# Patient Record
Sex: MALE | Race: WHITE | NOT HISPANIC OR LATINO | Employment: FULL TIME | URBAN - METROPOLITAN AREA
[De-identification: names, ages, dates, MRNs, and addresses within clinical notes are randomized per-mention and may not be internally consistent; named-entity substitution may affect disease eponyms.]

---

## 2017-01-27 ENCOUNTER — OFFICE VISIT (OUTPATIENT)
Dept: ORTHOPEDIC SURGERY | Facility: CLINIC | Age: 57
End: 2017-01-27

## 2017-01-27 VITALS — HEIGHT: 73 IN | BODY MASS INDEX: 28.49 KG/M2 | WEIGHT: 215 LBS | TEMPERATURE: 98 F

## 2017-01-27 DIAGNOSIS — M17.11 PRIMARY OSTEOARTHRITIS OF RIGHT KNEE: ICD-10-CM

## 2017-01-27 DIAGNOSIS — G89.29 CHRONIC PAIN OF RIGHT KNEE: Primary | ICD-10-CM

## 2017-01-27 DIAGNOSIS — M25.561 CHRONIC PAIN OF RIGHT KNEE: Primary | ICD-10-CM

## 2017-01-27 DIAGNOSIS — M75.101 TEAR OF RIGHT ROTATOR CUFF, UNSPECIFIED TEAR EXTENT: ICD-10-CM

## 2017-01-27 PROCEDURE — 20610 DRAIN/INJ JOINT/BURSA W/O US: CPT | Performed by: ORTHOPAEDIC SURGERY

## 2017-01-27 PROCEDURE — 73560 X-RAY EXAM OF KNEE 1 OR 2: CPT | Performed by: ORTHOPAEDIC SURGERY

## 2017-01-27 PROCEDURE — 73565 X-RAY EXAM OF KNEES: CPT | Performed by: ORTHOPAEDIC SURGERY

## 2017-01-27 RX ORDER — IBUPROFEN 800 MG/1
800 TABLET ORAL EVERY 6 HOURS PRN
COMMUNITY
End: 2018-08-08

## 2017-01-27 RX ADMIN — BUPIVACAINE HYDROCHLORIDE 2 ML: 5 INJECTION, SOLUTION PERINEURAL at 13:15

## 2017-01-27 RX ADMIN — LIDOCAINE HYDROCHLORIDE 2 ML: 10 INJECTION, SOLUTION INFILTRATION; PERINEURAL at 13:15

## 2017-01-27 RX ADMIN — METHYLPREDNISOLONE ACETATE 160 MG: 80 INJECTION, SUSPENSION INTRA-ARTICULAR; INTRALESIONAL; INTRAMUSCULAR; SOFT TISSUE at 13:14

## 2017-01-27 RX ADMIN — METHYLPREDNISOLONE ACETATE 160 MG: 80 INJECTION, SUSPENSION INTRA-ARTICULAR; INTRALESIONAL; INTRAMUSCULAR; SOFT TISSUE at 13:15

## 2017-01-27 RX ADMIN — BUPIVACAINE HYDROCHLORIDE 2 ML: 5 INJECTION, SOLUTION PERINEURAL at 13:14

## 2017-01-27 RX ADMIN — LIDOCAINE HYDROCHLORIDE 2 ML: 10 INJECTION, SOLUTION INFILTRATION; PERINEURAL at 13:14

## 2017-01-27 NOTE — MR AVS SNAPSHOT
Gui Zamora   2017 11:45 AM   Office Visit    Dept Phone:  [phone]   Encounter #:  [id number]    Provider:  Joao Lentz MD   Department:  [facility]                Your Full Care Plan              Your Updated Medication List          This list is accurate as of: 17 12:26 PM.  Always use your most recent med list.                ibuprofen 800 MG tablet   Commonly known as:  ADVIL,MOTRIN               We Performed the Following     XR Knee 1 or 2 View Bilateral     XR Knee 1 or 2 View Right       You Were Diagnosed With        Codes Comments    Chronic pain of right knee    -  Primary ICD-10-CM: M25.561, G89.29  ICD-9-CM: 719.46, 338.29       Instructions     None    Patient Instructions History      Upcoming Appointments     Visit Type Date Time Department    FOLLOW UP 2017 11:45 AM [facility]      nWay Signup     [facility] nWay allows you to send messages to your doctor, view your test results, renew your prescriptions, schedule appointments, and more. To sign up, go to [email or web address] and click on the Sign Up Now link in the New User? box. Enter your nWay Activation Code exactly as it appears below along with the last four digits of your Social Security Number and your Date of Birth () to complete the sign-up process. If you do not sign up before the expiration date, you must request a new code.    nWay Activation Code: [email or web address]  Expires: 2/10/2017 12:26 PM    If you have questions, you can email [email or web address] or call [phone] to talk to our nWay staff. Remember, nWay is NOT to be used for urgent needs. For medical emergencies, dial 911.               Other Info from Your Visit           Allergies     No Known Allergies      Reason for Visit     Right Knee - Follow-up, Pain           Vital Signs     Temperature Height Weight Body Mass  "Index Smoking Status       98 °F (36.7 °C) 73\" (185.4 cm) 215 lb (97.5 kg) 28.37 kg/m2 Never Smoker       Problems and Diagnoses Noted     Chronic pain of right knee    -  Primary      Results     XR Knee 1 or 2 View Bilateral           XR Knee 1 or 2 View Right               "

## 2017-01-29 RX ORDER — BUPIVACAINE HYDROCHLORIDE 5 MG/ML
2 INJECTION, SOLUTION PERINEURAL
Status: COMPLETED | OUTPATIENT
Start: 2017-01-27 | End: 2017-01-27

## 2017-01-29 RX ORDER — METHYLPREDNISOLONE ACETATE 80 MG/ML
160 INJECTION, SUSPENSION INTRA-ARTICULAR; INTRALESIONAL; INTRAMUSCULAR; SOFT TISSUE
Status: COMPLETED | OUTPATIENT
Start: 2017-01-27 | End: 2017-01-27

## 2017-01-29 RX ORDER — LIDOCAINE HYDROCHLORIDE 10 MG/ML
2 INJECTION, SOLUTION INFILTRATION; PERINEURAL
Status: COMPLETED | OUTPATIENT
Start: 2017-01-27 | End: 2017-01-27

## 2017-12-22 ENCOUNTER — OFFICE VISIT (OUTPATIENT)
Dept: ORTHOPEDIC SURGERY | Facility: CLINIC | Age: 57
End: 2017-12-22

## 2017-12-22 VITALS — TEMPERATURE: 98 F | HEIGHT: 73 IN | WEIGHT: 225 LBS | BODY MASS INDEX: 29.82 KG/M2

## 2017-12-22 DIAGNOSIS — G89.29 CHRONIC PAIN OF RIGHT KNEE: Primary | ICD-10-CM

## 2017-12-22 DIAGNOSIS — M25.561 CHRONIC PAIN OF RIGHT KNEE: Primary | ICD-10-CM

## 2017-12-22 DIAGNOSIS — M17.11 PRIMARY OSTEOARTHRITIS OF RIGHT KNEE: ICD-10-CM

## 2017-12-22 PROCEDURE — 99212 OFFICE O/P EST SF 10 MIN: CPT | Performed by: ORTHOPAEDIC SURGERY

## 2017-12-22 PROCEDURE — 73562 X-RAY EXAM OF KNEE 3: CPT | Performed by: ORTHOPAEDIC SURGERY

## 2017-12-22 RX ORDER — MELOXICAM 15 MG/1
15 TABLET ORAL DAILY PRN
Qty: 30 TABLET | Refills: 2 | Status: SHIPPED | OUTPATIENT
Start: 2017-12-22 | End: 2018-06-25 | Stop reason: SDUPTHER

## 2017-12-28 NOTE — PROGRESS NOTES
CC:  Right knee pain    Mr. Gomez comes back in today for the right knee.  The injections have worked well in the past and he wants to repeat that today.  His pain is slowly getting worse.  He describes it as constant and aching.  He has inquired about other treatment options.    Skin about the right knee is benign.  Mild medial tenderness.  Motion is still full.    Bilateral standing AP views, bilateral merchants views and a lateral view of the right knee are ordered by myself and reviewed to evaluate the patient's complaint.  These are compared to previous xrays.  The x-rays show significant degenerative arthritis including joint space narrowing, osteophyte formation, and subchondral sclerosis.  The majority of the degenerative changes appear to involve the medial and patellofemoral compartments.    Assessment:  Right knee osteoarthritis    I agreed to give him a Rx for Mobic.  The risks were discussed.  We also discussed the risks, benefits and alternatives to a repeat injection.  He consented to proceed.  He will follow up as needed.

## 2018-06-25 RX ORDER — MELOXICAM 15 MG/1
TABLET ORAL
Qty: 30 TABLET | Refills: 1 | Status: SHIPPED | OUTPATIENT
Start: 2018-06-25 | End: 2019-04-18 | Stop reason: SDUPTHER

## 2018-08-01 ENCOUNTER — OFFICE VISIT (OUTPATIENT)
Dept: ORTHOPEDIC SURGERY | Facility: CLINIC | Age: 58
End: 2018-08-01

## 2018-08-01 VITALS — WEIGHT: 215 LBS | BODY MASS INDEX: 28.49 KG/M2 | HEIGHT: 73 IN

## 2018-08-01 DIAGNOSIS — G89.29 CHRONIC PAIN OF RIGHT KNEE: Primary | ICD-10-CM

## 2018-08-01 DIAGNOSIS — Z00.00 HEALTH CARE MAINTENANCE: Primary | ICD-10-CM

## 2018-08-01 DIAGNOSIS — M25.561 CHRONIC PAIN OF RIGHT KNEE: Primary | ICD-10-CM

## 2018-08-01 DIAGNOSIS — Z12.5 PROSTATE CANCER SCREENING: ICD-10-CM

## 2018-08-01 DIAGNOSIS — M17.10 ARTHRITIS OF KNEE: ICD-10-CM

## 2018-08-01 LAB
ALBUMIN SERPL-MCNC: 4.5 G/DL (ref 3.5–5.2)
ALBUMIN/GLOB SERPL: 2 G/DL
ALP SERPL-CCNC: 72 U/L (ref 39–117)
ALT SERPL-CCNC: 25 U/L (ref 1–41)
AST SERPL-CCNC: 19 U/L (ref 1–40)
BASOPHILS # BLD AUTO: 0.02 10*3/MM3 (ref 0–0.2)
BASOPHILS NFR BLD AUTO: 0.4 % (ref 0–1.5)
BILIRUB SERPL-MCNC: 1.1 MG/DL (ref 0.1–1.2)
BUN SERPL-MCNC: 23 MG/DL (ref 6–20)
BUN/CREAT SERPL: 22.8 (ref 7–25)
CALCIUM SERPL-MCNC: 9.1 MG/DL (ref 8.6–10.5)
CHLORIDE SERPL-SCNC: 101 MMOL/L (ref 98–107)
CHOLEST SERPL-MCNC: 193 MG/DL (ref 0–200)
CO2 SERPL-SCNC: 28.3 MMOL/L (ref 22–29)
CREAT SERPL-MCNC: 1.01 MG/DL (ref 0.76–1.27)
EOSINOPHIL # BLD AUTO: 0.25 10*3/MM3 (ref 0–0.7)
EOSINOPHIL NFR BLD AUTO: 4.4 % (ref 0.3–6.2)
ERYTHROCYTE [DISTWIDTH] IN BLOOD BY AUTOMATED COUNT: 13.5 % (ref 11.5–14.5)
GLOBULIN SER CALC-MCNC: 2.2 GM/DL
GLUCOSE SERPL-MCNC: 104 MG/DL (ref 65–99)
HCT VFR BLD AUTO: 47.5 % (ref 40.4–52.2)
HDLC SERPL-MCNC: 33 MG/DL (ref 40–60)
HGB BLD-MCNC: 15.3 G/DL (ref 13.7–17.6)
IMM GRANULOCYTES # BLD: 0.02 10*3/MM3 (ref 0–0.03)
IMM GRANULOCYTES NFR BLD: 0.4 % (ref 0–0.5)
LDLC SERPL CALC-MCNC: 144 MG/DL (ref 0–100)
LYMPHOCYTES # BLD AUTO: 2.45 10*3/MM3 (ref 0.9–4.8)
LYMPHOCYTES NFR BLD AUTO: 43.1 % (ref 19.6–45.3)
MCH RBC QN AUTO: 30.3 PG (ref 27–32.7)
MCHC RBC AUTO-ENTMCNC: 32.2 G/DL (ref 32.6–36.4)
MCV RBC AUTO: 94.1 FL (ref 79.8–96.2)
MONOCYTES # BLD AUTO: 0.49 10*3/MM3 (ref 0.2–1.2)
MONOCYTES NFR BLD AUTO: 8.6 % (ref 5–12)
NEUTROPHILS # BLD AUTO: 2.45 10*3/MM3 (ref 1.9–8.1)
NEUTROPHILS NFR BLD AUTO: 43.1 % (ref 42.7–76)
PLATELET # BLD AUTO: 187 10*3/MM3 (ref 140–500)
POTASSIUM SERPL-SCNC: 4.4 MMOL/L (ref 3.5–5.2)
PROT SERPL-MCNC: 6.7 G/DL (ref 6–8.5)
PSA SERPL-MCNC: 3.48 NG/ML (ref 0–4)
RBC # BLD AUTO: 5.05 10*6/MM3 (ref 4.6–6)
SODIUM SERPL-SCNC: 140 MMOL/L (ref 136–145)
TRIGL SERPL-MCNC: 81 MG/DL (ref 0–150)
UNABLE TO VOID: NORMAL
VLDLC SERPL CALC-MCNC: 16.2 MG/DL (ref 5–40)
WBC # BLD AUTO: 5.68 10*3/MM3 (ref 4.5–10.7)

## 2018-08-01 PROCEDURE — 73562 X-RAY EXAM OF KNEE 3: CPT | Performed by: ORTHOPAEDIC SURGERY

## 2018-08-01 PROCEDURE — 99212 OFFICE O/P EST SF 10 MIN: CPT | Performed by: ORTHOPAEDIC SURGERY

## 2018-08-01 PROCEDURE — 20610 DRAIN/INJ JOINT/BURSA W/O US: CPT | Performed by: ORTHOPAEDIC SURGERY

## 2018-08-01 RX ORDER — METHYLPREDNISOLONE ACETATE 80 MG/ML
80 INJECTION, SUSPENSION INTRA-ARTICULAR; INTRALESIONAL; INTRAMUSCULAR; SOFT TISSUE
Status: COMPLETED | OUTPATIENT
Start: 2018-08-01 | End: 2018-08-01

## 2018-08-01 RX ORDER — LIDOCAINE HYDROCHLORIDE 10 MG/ML
2 INJECTION, SOLUTION EPIDURAL; INFILTRATION; INTRACAUDAL; PERINEURAL
Status: COMPLETED | OUTPATIENT
Start: 2018-08-01 | End: 2018-08-01

## 2018-08-01 RX ADMIN — LIDOCAINE HYDROCHLORIDE 2 ML: 10 INJECTION, SOLUTION EPIDURAL; INFILTRATION; INTRACAUDAL; PERINEURAL at 09:58

## 2018-08-01 RX ADMIN — METHYLPREDNISOLONE ACETATE 80 MG: 80 INJECTION, SUSPENSION INTRA-ARTICULAR; INTRALESIONAL; INTRAMUSCULAR; SOFT TISSUE at 09:58

## 2018-08-01 NOTE — PROGRESS NOTES
Chief Complaint:  Right knee pain    HPI:  Mr. Gomez comes in today for follow-up of the right knee.  The last injection helped tremendously.  He recently reaggravated the knee.  He has been having increased medial sided discomfort.  Denies any catching or locking.    Exam:  Right knee is examined.  Trace effusion.  Moderate medial joint line tenderness.  Medial Enio's is uncomfortable but not frankly positive.  Motion is limited and uncomfortable.    Imaging:  AP, merchants and lateral views of the right knee are ordered and reviewed.  These are compared to previous x-rays.  He has what appears to be advanced medial and patellofemoral compartment osteoarthritis with significant joint space narrowing, osteophyte formation and subchondral sclerosis.  Varus alignment.    Assessment:  Right knee osteoarthritis    Plan:  We again discussed options.  Ultimately, he is headed towards a replacement.  We will try to put that off as long as possible.  The risks, benefits and alternatives were discussed.  He consented.  Injection was performed as described below.  He will follow-up as needed.    Joao Lentz MD  08/01/2018      Large Joint Arthrocentesis  Date/Time: 8/1/2018 9:58 AM  Consent given by: patient  Site marked: site marked  Timeout: Immediately prior to procedure a time out was called to verify the correct patient, procedure, equipment, support staff and site/side marked as required   Supporting Documentation  Indications: pain and joint swelling   Procedure Details  Location: knee - R knee  Preparation: Patient was prepped and draped in the usual sterile fashion  Needle gauge: 21 g.  Approach: anterolateral  Medications administered: 80 mg methylPREDNISolone acetate 80 MG/ML; 2 mL lidocaine PF 1% 1 %  Patient tolerance: patient tolerated the procedure well with no immediate complications

## 2018-08-02 NOTE — PROGRESS NOTES
Lab work overall normal.  The LDL or bad cholesterol is slightly higher.  We will discuss more at upcoming visit.

## 2018-08-08 ENCOUNTER — OFFICE VISIT (OUTPATIENT)
Dept: FAMILY MEDICINE CLINIC | Facility: CLINIC | Age: 58
End: 2018-08-08

## 2018-08-08 VITALS
DIASTOLIC BLOOD PRESSURE: 89 MMHG | WEIGHT: 218.3 LBS | HEART RATE: 62 BPM | OXYGEN SATURATION: 97 % | TEMPERATURE: 97.3 F | BODY MASS INDEX: 28.93 KG/M2 | SYSTOLIC BLOOD PRESSURE: 138 MMHG | HEIGHT: 73 IN

## 2018-08-08 DIAGNOSIS — Z00.00 HEALTH CARE MAINTENANCE: Primary | ICD-10-CM

## 2018-08-08 DIAGNOSIS — R97.20 RISING PSA LEVEL: ICD-10-CM

## 2018-08-08 PROBLEM — M17.11 PRIMARY OSTEOARTHRITIS OF RIGHT KNEE: Status: ACTIVE | Noted: 2018-08-08

## 2018-08-08 PROCEDURE — 99386 PREV VISIT NEW AGE 40-64: CPT | Performed by: FAMILY MEDICINE

## 2018-08-08 PROCEDURE — 90471 IMMUNIZATION ADMIN: CPT | Performed by: FAMILY MEDICINE

## 2018-08-08 PROCEDURE — 90732 PPSV23 VACC 2 YRS+ SUBQ/IM: CPT | Performed by: FAMILY MEDICINE

## 2018-08-08 NOTE — PROGRESS NOTES
Subjective   Gui Zamora is a 58 y.o. male.     Chief Complaint   Patient presents with   • Annual Exam     follow labs   • Insomnia   • Weight Loss     not eating as much        History of Present Illness     Here for annual wellness visit.  Typically a New patient.  He has not seen me in greater than 3 years.    Some insomnia.  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  He was tested for sleep apnea he states through his wife's dentist.  His wife is not concerned about apneic episodes.  Negative depression screen.  Minimal cough use.    Erectile dysfunction.  Long-standing.  Sometimes a problem sometimes not.  He has intermittent libido.  He has no gynecomastia.  He has normal facial hair.  He was on testosterone replacement therapy years ago, he states he never made a difference.    A few pound weight loss.  He states he's eating better.  Not exercising as much is used to.    I reviewed lab work.  HDL remains low.  Moderate .  Total cholesterol less than 200.  Fasting glucose 104.    PSA went from 1.5-3.5 over 3 years..  No urological symptoms other than stable erectile dysfunction.      Social History   Substance Use Topics   • Smoking status: Never Smoker   • Smokeless tobacco: Never Used   • Alcohol use Yes      Comment: 2 drinks a week      Immunization History   Administered Date(s) Administered   • Pneumococcal Polysaccharide (PPSV23) 08/08/2018   • Tdap 06/19/2015       Family History   Problem Relation Age of Onset   • Diabetes Other    • Hypertension Other    • Diabetes Father    • Hypertension Father          The following portions of the patient's history were reviewed and updated as appropriate: allergies, current medications, past family history, past medical history, past social history, past surgical history and problem list.          Review of Systems   Constitutional: Negative.    HENT: Negative.    Respiratory: Negative.    Cardiovascular: Negative.    Gastrointestinal: Negative.   "Negative for blood in stool.   Endocrine: Negative.    Genitourinary: Negative.  Negative for decreased urine volume, dysuria, hematuria, testicular pain and urgency.   Musculoskeletal: Negative.    Skin: Negative.    Neurological: Negative.  Negative for headaches.   Psychiatric/Behavioral: Negative.    All other systems reviewed and are negative.      Objective   Blood pressure 138/89, pulse 62, temperature 97.3 °F (36.3 °C), temperature source Oral, height 185.4 cm (72.99\"), weight 99 kg (218 lb 4.8 oz), SpO2 97 %.  Physical Exam   Constitutional: He is oriented to person, place, and time. No distress.   HENT:   Head: Normocephalic and atraumatic.   Right Ear: External ear normal.   Left Ear: External ear normal.   Mouth/Throat: Oropharynx is clear and moist.   Eyes: Pupils are equal, round, and reactive to light. EOM are normal.   Neck: Normal range of motion. Neck supple.   Cardiovascular: Normal rate and regular rhythm.    Pulmonary/Chest: Effort normal and breath sounds normal.   Abdominal: Soft. Bowel sounds are normal. He exhibits no distension and no mass. There is no tenderness. There is no guarding. No hernia.   Genitourinary: Prostate is enlarged. Prostate is not tender.   Genitourinary Comments: Prostate slightly enlarged.  +3 size.  Smooth.  Symmetric.  No nodules.  No masses.   Musculoskeletal: Normal range of motion. He exhibits no edema or tenderness.   Neurological: He is alert and oriented to person, place, and time. He exhibits normal muscle tone. Coordination normal.   Skin: Skin is warm and dry.   Psychiatric: He has a normal mood and affect. His behavior is normal.   Nursing note and vitals reviewed.      Assessment/Plan   Gui was seen today for annual exam, insomnia and weight loss.    Diagnoses and all orders for this visit:    Health care maintenance    Rising PSA level  -     PSA Screen; Future    Other orders  -     Pneumococcal Polysaccharide Vaccine 23-Valent Greater Than or Equal " To 3yo Subcutaneous / IM      Annual wellness visit.    Immunizations.  Recommend Pneumovax today.  He'll be 59 years old in less than one year.    Colon cancer screening up-to-date a few years ago.  10 year follow-up.    Prostate cancer screening.  PSA less than 4.  Unremarkable prostate examination other than slightly enlarged prostate.  No urological symptoms of concern.  Given the rising PSA level I do recommend a repeat PSA screen in 3 months.  With any urological symptoms such as hematuria, changes in voiding, other concerns please let us know.    Insomnia.  Probably functional.  Sleep hygiene discussed.    Low HDL.  Recommend increase exercise.    Slight weight loss.  Recommend observation.  He's been eating better.  It is weight loss continues, he is to return.

## 2018-11-06 ENCOUNTER — RESULTS ENCOUNTER (OUTPATIENT)
Dept: FAMILY MEDICINE CLINIC | Facility: CLINIC | Age: 58
End: 2018-11-06

## 2018-11-06 DIAGNOSIS — R97.20 RISING PSA LEVEL: ICD-10-CM

## 2018-11-07 LAB — PSA SERPL-MCNC: 3.04 NG/ML (ref 0–4)

## 2018-11-08 NOTE — PROGRESS NOTES
The PSA level is lower and continues to be normal. We should continue to check yearly. With any change in urine function please let us know.

## 2019-03-06 ENCOUNTER — OFFICE VISIT (OUTPATIENT)
Dept: FAMILY MEDICINE CLINIC | Facility: CLINIC | Age: 59
End: 2019-03-06

## 2019-03-06 VITALS
SYSTOLIC BLOOD PRESSURE: 130 MMHG | BODY MASS INDEX: 30.89 KG/M2 | OXYGEN SATURATION: 96 % | WEIGHT: 233.1 LBS | DIASTOLIC BLOOD PRESSURE: 84 MMHG | HEART RATE: 67 BPM | HEIGHT: 73 IN | TEMPERATURE: 97.1 F

## 2019-03-06 DIAGNOSIS — M72.2 PLANTAR FASCIITIS: Primary | ICD-10-CM

## 2019-03-06 PROCEDURE — 99213 OFFICE O/P EST LOW 20 MIN: CPT | Performed by: FAMILY MEDICINE

## 2019-03-06 NOTE — PROGRESS NOTES
"Subjective   Gui Zamora is a 58 y.o. male.     Chief Complaint   Patient presents with   • Plantar Fasciitis     left foot x 2-3 month, NKI        History of Present Illness      Months long history of left heel pain.  Mostly of the left heel but not radiating to the left side of the foot.  No radiculopathy.  No numbness.  No weakness.  No swelling.  No injury.  He walks on concrete floors a number of hours a day at work.  He has been using heel supports and heel inserts.  Little help.  He has flat feet.    The following portions of the patient's history were reviewed and updated as appropriate: allergies, current medications, past family history, past medical history, past social history, past surgical history and problem list.          Review of Systems   Constitutional: Negative.    Musculoskeletal: Negative for joint swelling.   Neurological: Negative for weakness and numbness.       Objective   Blood pressure 130/84, pulse 67, temperature 97.1 °F (36.2 °C), temperature source Oral, height 185.4 cm (72.99\"), weight 106 kg (233 lb 1.6 oz), SpO2 96 %.  Physical Exam   Constitutional: No distress.   Cardiovascular: Normal rate.   Pulmonary/Chest: Effort normal.   Musculoskeletal:   Left foot.  Good pulses.  No distal metatarsal discomfort.  He has pain to palpation at the insertion of the plantar fascia at the calcaneus.  No tendon discomfort.  No pain with compression of the calcaneus laterally.  Good range of motion the ankle.  He has pes planus.   Skin: He is not diaphoretic.       Assessment/Plan   Gui was seen today for plantar fasciitis.    Diagnoses and all orders for this visit:    Plantar fasciitis  -     Ambulatory Referral to Physical Therapy Ortho      Plantar fasciitis.  Exacerbated by pes planus and walking on concrete all day long.  Recommend nighttime heel splints.  Recommend stretching exercises.  He will continue the heel insert.  I am referring him to physical therapy for possible " orthotics.  Patient understands this typically gets better in 6-12 months.  If is getting much worse would recommend orthopedic consultation.

## 2019-04-15 RX ORDER — MELOXICAM 15 MG/1
TABLET ORAL
Qty: 30 TABLET | Refills: 0 | OUTPATIENT
Start: 2019-04-15

## 2019-04-18 RX ORDER — MELOXICAM 15 MG/1
15 TABLET ORAL DAILY
Qty: 30 TABLET | Refills: 1 | Status: SHIPPED | OUTPATIENT
Start: 2019-04-18 | End: 2019-06-26 | Stop reason: SDUPTHER

## 2019-06-26 ENCOUNTER — OFFICE VISIT (OUTPATIENT)
Dept: ORTHOPEDIC SURGERY | Facility: CLINIC | Age: 59
End: 2019-06-26

## 2019-06-26 VITALS — WEIGHT: 225 LBS | HEIGHT: 73 IN | TEMPERATURE: 97.9 F | BODY MASS INDEX: 29.82 KG/M2

## 2019-06-26 DIAGNOSIS — M25.561 CHRONIC PAIN OF RIGHT KNEE: Primary | ICD-10-CM

## 2019-06-26 DIAGNOSIS — G89.29 CHRONIC PAIN OF RIGHT KNEE: Primary | ICD-10-CM

## 2019-06-26 DIAGNOSIS — M17.10 ARTHRITIS OF KNEE: ICD-10-CM

## 2019-06-26 PROCEDURE — 20610 DRAIN/INJ JOINT/BURSA W/O US: CPT | Performed by: ORTHOPAEDIC SURGERY

## 2019-06-26 PROCEDURE — 73562 X-RAY EXAM OF KNEE 3: CPT | Performed by: ORTHOPAEDIC SURGERY

## 2019-06-26 RX ORDER — METHYLPREDNISOLONE ACETATE 80 MG/ML
80 INJECTION, SUSPENSION INTRA-ARTICULAR; INTRALESIONAL; INTRAMUSCULAR; SOFT TISSUE
Status: COMPLETED | OUTPATIENT
Start: 2019-06-26 | End: 2019-06-26

## 2019-06-26 RX ORDER — MELOXICAM 15 MG/1
15 TABLET ORAL DAILY PRN
Qty: 30 TABLET | Refills: 1 | Status: SHIPPED | OUTPATIENT
Start: 2019-06-26 | End: 2019-12-20 | Stop reason: HOSPADM

## 2019-06-26 RX ORDER — LIDOCAINE HYDROCHLORIDE 10 MG/ML
2 INJECTION, SOLUTION EPIDURAL; INFILTRATION; INTRACAUDAL; PERINEURAL
Status: COMPLETED | OUTPATIENT
Start: 2019-06-26 | End: 2019-06-26

## 2019-06-26 RX ADMIN — METHYLPREDNISOLONE ACETATE 80 MG: 80 INJECTION, SUSPENSION INTRA-ARTICULAR; INTRALESIONAL; INTRAMUSCULAR; SOFT TISSUE at 11:34

## 2019-06-26 RX ADMIN — LIDOCAINE HYDROCHLORIDE 2 ML: 10 INJECTION, SOLUTION EPIDURAL; INFILTRATION; INTRACAUDAL; PERINEURAL at 11:34

## 2019-06-26 NOTE — PROGRESS NOTES
Mr. Gomez comes in today for follow-up.  Injections have worked well in the past.  The patient would like to get a repeat injection today.  AP, merchant and lateral views of the right knee are ordered and reviewed today.  These compared to previous x-rays.  He has advanced medial compartment osteoarthritis.  There are degenerative changes of the patellofemoral and lateral compartments as well.  Chondrocalcinosis of the lateral meniscus.  The risks, benefits and alternatives to the injection were discussed and the patient consented.  Going forward, the patient will follow-up as needed.    Joao Lentz MD    06/26/2019    Large Joint Arthrocentesis: R knee  Date/Time: 6/26/2019 11:34 AM  Consent given by: patient  Site marked: site marked  Timeout: Immediately prior to procedure a time out was called to verify the correct patient, procedure, equipment, support staff and site/side marked as required   Supporting Documentation  Indications: pain and joint swelling   Procedure Details  Location: knee - R knee  Preparation: Patient was prepped and draped in the usual sterile fashion  Needle gauge: 21 G.  Approach: anterolateral  Medications administered: 80 mg methylPREDNISolone acetate 80 MG/ML; 2 mL lidocaine PF 1% 1 %  Patient tolerance: patient tolerated the procedure well with no immediate complications

## 2019-07-10 ENCOUNTER — OFFICE VISIT (OUTPATIENT)
Dept: ORTHOPEDIC SURGERY | Facility: CLINIC | Age: 59
End: 2019-07-10

## 2019-07-10 VITALS — HEIGHT: 74 IN | BODY MASS INDEX: 29.54 KG/M2 | WEIGHT: 230.2 LBS

## 2019-07-10 DIAGNOSIS — M72.2 PLANTAR FASCIITIS: ICD-10-CM

## 2019-07-10 DIAGNOSIS — M25.872 IMPINGEMENT SYNDROME OF LEFT ANKLE: ICD-10-CM

## 2019-07-10 DIAGNOSIS — M21.40 PES PLANUS, UNSPECIFIED LATERALITY: ICD-10-CM

## 2019-07-10 DIAGNOSIS — M79.672 FOOT PAIN, LEFT: Primary | ICD-10-CM

## 2019-07-10 PROCEDURE — 73630 X-RAY EXAM OF FOOT: CPT | Performed by: ORTHOPAEDIC SURGERY

## 2019-07-10 PROCEDURE — 99214 OFFICE O/P EST MOD 30 MIN: CPT | Performed by: ORTHOPAEDIC SURGERY

## 2019-07-10 NOTE — PROGRESS NOTES
"   New Patient Complaint      Patient: Gui Zamora  YOB: 1960 59 y.o. male  Medical Record Number: [id number]    Chief Complaints: My foot hurts    History of Present Illness:    Patient describes a 6-month history of moderate intermittent aching burning pain in the plantar aspect of the foot along the insertion of the plantar fascia with associated redness and swelling worse with standing and walking improved with anti-inflammatories and rest.  He is also noticed more recently some intermittent discomfort over the dorsal medial aspect of the hindfoot.    He has had a history of chronic flatfeet his whole life and is not changed in alignment.    About 2 months ago he gotten online night splint which has helped with his heel pain but has difficulty wearing it all night.  He also more recently about 3 days got some online arch supports which she says is been \"helping a lot\"    He gets fair amount of start up pain in the morning as well as after prolonged sitting and arising from prolonged seated position.    HPI    Allergies: No Known Allergies    Medications:   Current Outpatient Medications on File Prior to Visit   Medication Sig   • meloxicam (MOBIC) 15 MG tablet Take 1 tablet by mouth Daily As Needed for Moderate Pain .     No current facility-administered medications on file prior to visit.        History reviewed. No pertinent past medical history.  Past Surgical History:   Procedure Laterality Date   • HERNIA REPAIR       Social History     Occupational History   • Not on file   Tobacco Use   • Smoking status: Never Smoker   • Smokeless tobacco: Never Used   Substance and Sexual Activity   • Alcohol use: Yes     Comment: 2 drinks a week    • Drug use: No   • Sexual activity: Not on file      Social History     Social History Narrative   • Not on file     Family History   Problem Relation Age of Onset   • Diabetes Other    • Hypertension Other    • Diabetes Father    • Hypertension Father  " "      Review of Systems: 14 point review of systems performed, positive pertinent findings identified in HPI. All remaining systems negative     Review of Systems      Physical Exam:   Vitals:    07/10/19 0855   Weight: 104 kg (230 lb 3.2 oz)   Height: 188 cm (74\")     Physical Exam   Constitutional: pleasant, well developed   Eyes: sclera non icteric  Hearing : adequate for exam  Cardiovascular: palpable pulses in left foot, left calf/ thigh NT without sign of DVT  Respiratoy: breathing unlabored   Neurological: grossly sensate to LT throughout left LE  Psychiatric: oriented with normal mood and affect.   Lymphatic: No palpable popliteal lymphadenopathy left LE  Skin: intact throughout left leg/foot  Musculoskeletal: Moderate pes planus deformity which is passively correctable.  Moderate discomfort to palpation of the insertion of the plantar fascia but no focal discomfort with medial lateral calcaneal compression.  Neutral dorsiflexion of the heel cord with the hindfoot in a corrected position.  No focal discomfort on the posterior tib tendon.  Mild discomfort in the sinus tarsi.  No focal discomfort along the peroneal tendons or fibula  Physical Exam  Ortho Exam    Radiology: 3 views of the left foot ordered to evaluate pain reviewed and no prior x-rays available for comparison there is relative elongation of the second and third metatarsals compared to the first.  There is an apparent loss perineum as well as plantar and insertional calcific spurring.    Assessment/Plan: 1.  Left plantar fasciitis  2.  Left chronic pes planus with anterolateral impingement    We discussed treatment options and nothing I would recommend from a surgical standpoint and I am encouraged that both symptoms have improved with use of the orthotics and with the night splint.    We outlined further treatment options for him and demonstrated heel cord stretching exercises to do at least 4 times a day.  Instruction she was provided and " demonstrated for him discussed etiology of heel cord tightness to plantar fasciitis and its association with pes planus.    Also have him use ice bottle massage for 15 to 20 minutes each night and continue with accommodative shoes with good arch support.  If symptoms persist or worsen we discussed additional treatment modalities which can include injection therapy and custom orthotics but nothing I would recommend from a surgical standpoint at this time.    If anything worsens let me know otherwise I will see him back as needed

## 2019-07-19 ENCOUNTER — TELEPHONE (OUTPATIENT)
Dept: ORTHOPEDIC SURGERY | Facility: CLINIC | Age: 59
End: 2019-07-19

## 2019-07-19 ENCOUNTER — PREP FOR SURGERY (OUTPATIENT)
Dept: OTHER | Facility: HOSPITAL | Age: 59
End: 2019-07-19

## 2019-07-19 DIAGNOSIS — M17.11 PRIMARY OSTEOARTHRITIS OF RIGHT KNEE: Primary | ICD-10-CM

## 2019-07-19 RX ORDER — MELOXICAM 15 MG/1
15 TABLET ORAL ONCE
Status: CANCELLED | OUTPATIENT
Start: 2019-12-19 | End: 2019-07-19

## 2019-07-19 RX ORDER — PREGABALIN 75 MG/1
150 CAPSULE ORAL ONCE
Status: CANCELLED | OUTPATIENT
Start: 2019-12-19 | End: 2019-07-19

## 2019-07-19 RX ORDER — CEFAZOLIN SODIUM 2 G/100ML
2 INJECTION, SOLUTION INTRAVENOUS ONCE
Status: CANCELLED | OUTPATIENT
Start: 2019-12-19 | End: 2019-07-19

## 2019-07-19 RX ORDER — ACETAMINOPHEN 500 MG
1000 TABLET ORAL ONCE
Status: CANCELLED | OUTPATIENT
Start: 2019-12-19 | End: 2019-07-19

## 2019-07-19 NOTE — TELEPHONE ENCOUNTER
I spoke to Mr. Zamora.  He would like to proceed with having his right knee replaced.  I explained our surgery scheduler will contact him to discuss the arrangements.  Also, we discussed the benefits of physical therapy to prehab his knee prior to surgery.  He will call us back in October to request that referral.

## 2019-07-19 NOTE — TELEPHONE ENCOUNTER
Patient says he is ready to have his right knee TKA. He is wanting the surgery if December possibly.

## 2019-09-25 ENCOUNTER — OFFICE VISIT (OUTPATIENT)
Dept: ORTHOPEDIC SURGERY | Facility: CLINIC | Age: 59
End: 2019-09-25

## 2019-09-25 VITALS — TEMPERATURE: 97.6 F | WEIGHT: 231.8 LBS | BODY MASS INDEX: 29.75 KG/M2 | HEIGHT: 74 IN

## 2019-09-25 DIAGNOSIS — M25.562 LEFT KNEE PAIN, UNSPECIFIED CHRONICITY: Primary | ICD-10-CM

## 2019-09-25 PROCEDURE — 99214 OFFICE O/P EST MOD 30 MIN: CPT | Performed by: ORTHOPAEDIC SURGERY

## 2019-09-25 PROCEDURE — 73562 X-RAY EXAM OF KNEE 3: CPT | Performed by: ORTHOPAEDIC SURGERY

## 2019-09-25 PROCEDURE — 20610 DRAIN/INJ JOINT/BURSA W/O US: CPT | Performed by: ORTHOPAEDIC SURGERY

## 2019-09-25 RX ORDER — METHYLPREDNISOLONE ACETATE 80 MG/ML
80 INJECTION, SUSPENSION INTRA-ARTICULAR; INTRALESIONAL; INTRAMUSCULAR; SOFT TISSUE
Status: COMPLETED | OUTPATIENT
Start: 2019-09-25 | End: 2019-09-25

## 2019-09-25 RX ADMIN — METHYLPREDNISOLONE ACETATE 80 MG: 80 INJECTION, SUSPENSION INTRA-ARTICULAR; INTRALESIONAL; INTRAMUSCULAR; SOFT TISSUE at 10:10

## 2019-09-25 NOTE — PROGRESS NOTES
Patient: Gui Zamora    YOB: 1960    Medical Record Number: [id number]    Chief Complaints: New complaint of left knee pain    History of Present Illness:     59 y.o. male patient who presents for his left knee.  The left knee has started to bother him over the past 8 weeks.  No injury.  He reports pain with bending, twisting and squatting.  Pain is moderate to severe, intermittent and stabbing.  He has noticed some clicking and popping.  Denies any catching or locking.  Rest and ice have both helped somewhat.    Allergies: No Known Allergies    Home Medications:    Current Outpatient Medications:   •  meloxicam (MOBIC) 15 MG tablet, Take 1 tablet by mouth Daily As Needed for Moderate Pain ., Disp: 30 tablet, Rfl: 1    History reviewed. No pertinent past medical history.    Past Surgical History:   Procedure Laterality Date   • HERNIA REPAIR         Social History     Occupational History   • Not on file   Tobacco Use   • Smoking status: Never Smoker   • Smokeless tobacco: Never Used   Substance and Sexual Activity   • Alcohol use: Yes     Comment: 2 drinks a week    • Drug use: No   • Sexual activity: Not on file      Social History     Social History Narrative   • Not on file       Family History   Problem Relation Age of Onset   • Diabetes Other    • Hypertension Other    • Diabetes Father    • Hypertension Father        Review of Systems:      Constitutional: Denies fever, shaking or chills   Eyes: Denies change in visual acuity   HEENT: Denies nasal congestion or sore throat   Respiratory: Denies cough or shortness of breath   Cardiovascular: Denies chest pain or edema  Endocrine: Denies tremors, palpitations, intolerance of heat or cold, polyuria, polydipsia.  GI: Denies abdominal pain, nausea, vomiting, bloody stools or diarrhea  : Denies frequency, urgency, incontinence, retention, or nocturia.  Musculoskeletal: Denies numbness, tingling or loss of motor function except as  "above  Integument: Denies rash, lesion or ulceration   Neurologic: Denies headache or focal weakness, deficits  Heme: Denies spontaneous or excessive bleeding, epistaxis, hematuria, melena, fatigue, enlarged or tender lymph nodes.      All other pertinent positives and negatives as noted above in HPI.    Physical Exam: 59 y.o. male  Vitals:    09/25/19 0945   Temp: 97.6 °F (36.4 °C)   TempSrc: Temporal   Weight: 105 kg (231 lb 12.8 oz)   Height: 188 cm (74\")       General:  Patient is awake and alert.  Appears in no acute distress or discomfort.    Psych:  Affect and demeanor are appropriate.    Eyes:  Conjunctiva and sclera appear grossly normal.  Eyes track well and EOM seem to be intact.    Ears:  No gross abnormalities.  Hearing adequate for the exam.    Cardiovascular:  Regular rate and rhythm.    Lungs:  Good chest expansion.  Breathing unlabored.    Extremities: The left knee is examined.  Skin is benign.  Moderate medial joint line tenderness.  Positive medial Enio's.  Full knee motion but he has discomfort with hyperflexion.  No instability.  Good motor and sensory function throughout the leg and foot.  Palpable pedal pulses.  Brisk capillary refill.    Imaging: AP, merchant and lateral views of the left knee are ordered and reviewed.  These compared to previous x-rays.  He has advanced arthritis on the right.  No significant degenerative changes on the left.  No lesions, masses, acute abnormalities or other concerning findings.    Assessment/Plan: Left knee pain, suspected degenerative meniscal tear    We discussed options for him.  He is scheduled for a right total knee in December.  He is looking for some relief for the left knee at this time.  We talked about further work-up with an MRI.  He wants to start with an injection.  I think that is reasonable.  The risk, benefits and alternatives were discussed but he consented and the injection was performed as described below.  He will follow-up as " needed.  I told him the next step would be further work-up with an MRI.    Joao Lentz MD    09/25/2019      Large Joint Arthrocentesis: L knee  Date/Time: 9/25/2019 10:10 AM  Consent given by: patient  Site marked: site marked  Timeout: Immediately prior to procedure a time out was called to verify the correct patient, procedure, equipment, support staff and site/side marked as required   Supporting Documentation  Indications: pain and joint swelling   Procedure Details  Location: knee - L knee  Preparation: Patient was prepped and draped in the usual sterile fashion  Needle gauge: 21 G.  Approach: anterolateral  Medications administered: 80 mg methylPREDNISolone acetate 80 MG/ML; 2 mL lidocaine (cardiac)  Patient tolerance: patient tolerated the procedure well with no immediate complications

## 2019-12-05 ENCOUNTER — APPOINTMENT (OUTPATIENT)
Dept: PREADMISSION TESTING | Facility: HOSPITAL | Age: 59
End: 2019-12-05

## 2019-12-05 VITALS
OXYGEN SATURATION: 98 % | RESPIRATION RATE: 16 BRPM | HEART RATE: 59 BPM | BODY MASS INDEX: 30.15 KG/M2 | DIASTOLIC BLOOD PRESSURE: 84 MMHG | HEIGHT: 74 IN | WEIGHT: 234.9 LBS | SYSTOLIC BLOOD PRESSURE: 160 MMHG | TEMPERATURE: 97.7 F

## 2019-12-05 DIAGNOSIS — M17.11 PRIMARY OSTEOARTHRITIS OF RIGHT KNEE: ICD-10-CM

## 2019-12-05 LAB
ABO GROUP BLD: NORMAL
BACTERIA UR QL AUTO: NORMAL /HPF
BILIRUB UR QL STRIP: NEGATIVE
BLD GP AB SCN SERPL QL: NEGATIVE
CLARITY UR: CLEAR
COLOR UR: YELLOW
DEPRECATED RDW RBC AUTO: 43 FL (ref 37–54)
ERYTHROCYTE [DISTWIDTH] IN BLOOD BY AUTOMATED COUNT: 13.2 % (ref 12.3–15.4)
GLUCOSE UR STRIP-MCNC: NEGATIVE MG/DL
HCT VFR BLD AUTO: 44.6 % (ref 37.5–51)
HGB BLD-MCNC: 15.5 G/DL (ref 13–17.7)
HGB UR QL STRIP.AUTO: ABNORMAL
HYALINE CASTS UR QL AUTO: NORMAL /LPF
KETONES UR QL STRIP: NEGATIVE
LEUKOCYTE ESTERASE UR QL STRIP.AUTO: NEGATIVE
MCH RBC QN AUTO: 31.2 PG (ref 26.6–33)
MCHC RBC AUTO-ENTMCNC: 34.8 G/DL (ref 31.5–35.7)
MCV RBC AUTO: 89.7 FL (ref 79–97)
NITRITE UR QL STRIP: NEGATIVE
PH UR STRIP.AUTO: 5.5 [PH] (ref 5–8)
PLATELET # BLD AUTO: 238 10*3/MM3 (ref 140–450)
PMV BLD AUTO: 9.7 FL (ref 6–12)
PROT UR QL STRIP: NEGATIVE
RBC # BLD AUTO: 4.97 10*6/MM3 (ref 4.14–5.8)
RBC # UR: NORMAL /HPF
REF LAB TEST METHOD: NORMAL
RH BLD: POSITIVE
SP GR UR STRIP: 1.02 (ref 1–1.03)
SQUAMOUS #/AREA URNS HPF: NORMAL /HPF
T&S EXPIRATION DATE: NORMAL
UROBILINOGEN UR QL STRIP: ABNORMAL
WBC NRBC COR # BLD: 6.93 10*3/MM3 (ref 3.4–10.8)
WBC UR QL AUTO: NORMAL /HPF

## 2019-12-05 PROCEDURE — 36415 COLL VENOUS BLD VENIPUNCTURE: CPT

## 2019-12-05 PROCEDURE — 93005 ELECTROCARDIOGRAM TRACING: CPT

## 2019-12-05 PROCEDURE — 93010 ELECTROCARDIOGRAM REPORT: CPT | Performed by: INTERNAL MEDICINE

## 2019-12-05 PROCEDURE — 81001 URINALYSIS AUTO W/SCOPE: CPT | Performed by: NURSE PRACTITIONER

## 2019-12-05 PROCEDURE — 86900 BLOOD TYPING SEROLOGIC ABO: CPT | Performed by: NURSE PRACTITIONER

## 2019-12-05 PROCEDURE — 85027 COMPLETE CBC AUTOMATED: CPT | Performed by: ORTHOPAEDIC SURGERY

## 2019-12-05 PROCEDURE — 86850 RBC ANTIBODY SCREEN: CPT | Performed by: NURSE PRACTITIONER

## 2019-12-05 PROCEDURE — 86901 BLOOD TYPING SEROLOGIC RH(D): CPT | Performed by: NURSE PRACTITIONER

## 2019-12-05 ASSESSMENT — KOOS JR
KOOS JR SCORE: 42.281
KOOS JR SCORE: 18

## 2019-12-05 NOTE — DISCHARGE INSTRUCTIONS
ARRIVAL TIME 05:30        General Instructions:  • Do not eat solid food after midnight the night before surgery.  • You may drink clear liquids day of surgery but must stop at least one hour before your hospital arrival time.  • It is beneficial for you to have a clear drink that contains carbohydrates the day of surgery.  We suggest a 12 to 20 ounce bottle of Gatorade or Powerade for non-diabetic patients or a 12 to 20 ounce bottle of G2 or Powerade Zero for diabetic patients. (Pediatric patients, are not advised to drink a 12 to 20 ounce carbohydrate drink)    Clear liquids are liquids you can see through.  Nothing red in color.     Plain water                               Sports drinks  Sodas                                   Gelatin (Jell-O)  Fruit juices without pulp such as white grape juice and apple juice  Popsicles that contain no fruit or yogurt  Tea or coffee (no cream or milk added)  Gatorade / Powerade  G2 / Powerade Zero    • Infants may have breast milk up to four hours before surgery.  • Infants drinking formula may drink formula up to six hours before surgery.   • Patients who avoid smoking, chewing tobacco and alcohol for 4 weeks prior to surgery have a reduced risk of post-operative complications.  Quit smoking as many days before surgery as you can.  • Do not smoke, use chewing tobacco or drink alcohol the day of surgery.   • If applicable bring your C-PAP/ BI-PAP machine.  • Bring any papers given to you in the doctor’s office.  • Wear clean comfortable clothes.  • Do not wear contact lenses, false eyelashes or make-up.  Bring a case for your glasses.   • Bring crutches or walker if applicable.  • Remove all piercings.  Leave jewelry and any other valuables at home.  • Hair extensions with metal clips must be removed prior to surgery.  • The Pre-Admission Testing nurse will instruct you to bring medications if unable to obtain an accurate list in Pre-Admission Testing.        If you were given  a blood bank ID arm band remember to bring it with you the day of surgery.    Preventing a Surgical Site Infection:  • For 2 to 3 days before surgery, avoid shaving with a razor because the razor can irritate skin and make it easier to develop an infection.    • Any areas of open skin can increase the risk of a post-operative wound infection by allowing bacteria to enter and travel throughout the body.  Notify your surgeon if you have any skin wounds / rashes even if it is not near the expected surgical site.  The area will need assessed to determine if surgery should be delayed until it is healed.  • The night prior to surgery sleep in a clean bed with clean clothing.  Do not allow pets to sleep with you.  • Shower on the morning of surgery using a fresh bar of anti-bacterial soap (such as Dial) and clean washcloth.  Dry with a clean towel and dress in clean clothing.  • Ask your surgeon if you will be receiving antibiotics prior to surgery.  • Make sure you, your family, and all healthcare providers clean their hands with soap and water or an alcohol based hand  before caring for you or your wound.    Day of surgery:  Your arrival time is approximately two hours before your scheduled surgery time.  Upon arrival, a Pre-op nurse and Anesthesiologist will review your health history, obtain vital signs, and answer questions you may have.  The only belongings needed at this time will be a list of your home medications and if applicable your C-PAP/BI-PAP machine.  If you are staying overnight your family can leave the rest of your belongings in the car and bring them to your room later.  A Pre-op nurse will start an IV and you may receive medication in preparation for surgery, including something to help you relax.  Your family will be able to see you in the Pre-op area.  Two visitors at a time will be allowed in the Pre-op room.  While you are in surgery your family should notify the waiting room   if they leave the waiting room area and provide a contact phone number.    Please be aware that surgery does come with discomfort.  We want to make every effort to control your discomfort so please discuss any uncontrolled symptoms with your nurse.   Your doctor will most likely have prescribed pain medications.      If you are going home after surgery you will receive individualized written care instructions before being discharged.  A responsible adult must drive you to and from the hospital on the day of your surgery and stay with you for 24 hours.    If you are staying overnight following surgery, you will be transported to your hospital room following the recovery period.  [facility] has all private rooms.    You have received a list of surgical assistants for your reference.  If you have any questions please call Pre-Admission Testing at [phone].  Deductibles and co-payments are collected on the day of service. Please be prepared to pay the required co-pay, deductible or deposit on the day of service as defined by your plan.    2% CHLORAHEXIDINE GLUCONATE* CLOTH  Preparing or “prepping” skin before surgery can reduce the risk of infection at the surgical site. To make the process easier, [facility] has chosen disposable cloths moistened with a rinse-free, 2% Chlorhexidine Gluconate (CHG) antiseptic solution. The steps below outline the prepping process and should be carefully followed.        Use the prep cloth on the area that is circled in the diagram             Directions Night before Surgery  1) Shower using a fresh bar of anti-bacterial soap (such as Dial) and clean washcloth.  Use a clean towel to completely dry your skin.  2) Do not use any lotions, oils or creams on your skin.  3) Open the package and remove 1 cloth, wipe your skin for 30 seconds in a circular motion.  Allow to dry for 3 minutes.  4) Repeat #3 with second cloth.  5) Do not touch your eyes, ears, or mouth  with the prep cloth.  6) Allow the wet prep solution to air dry.  7) Discard the prep cloth and wash your hands with soap and water.   8) Dress in clean bed clothes and sleep on fresh clean bed sheets.   9) You may experience some temporary itching after the prep.    Directions Day of Surgery  1) Repeat steps 1,2,3,4,5,6,7, and 9.   2) Dress in clean clothes before coming to the hospital.    BACTROBAN NASAL OINTMENT  There are many germs normally in your nose. Bactroban is an ointment that will help reduce these germs. Please follow these instructions for Bactroban use:      ____The day before surgery in the morning  Date__12/18______    ____The day before surgery in the evening              Date__12/18______    ____The day of surgery in the morning    Date__12/19______    **Squirt ½ package of Bactroban Ointment onto a cotton applicator and apply to inside of 1st nostril.  Squirt the remaining Bactroban and apply to the inside of the other nostril.

## 2019-12-13 ENCOUNTER — OFFICE VISIT (OUTPATIENT)
Dept: ORTHOPEDIC SURGERY | Facility: CLINIC | Age: 59
End: 2019-12-13

## 2019-12-13 VITALS — BODY MASS INDEX: 30.03 KG/M2 | TEMPERATURE: 99.2 F | WEIGHT: 234 LBS | HEIGHT: 74 IN

## 2019-12-13 DIAGNOSIS — M25.562 LEFT KNEE PAIN, UNSPECIFIED CHRONICITY: ICD-10-CM

## 2019-12-13 DIAGNOSIS — Z01.818 PRE-OP EVALUATION: Primary | ICD-10-CM

## 2019-12-13 PROCEDURE — 20610 DRAIN/INJ JOINT/BURSA W/O US: CPT | Performed by: ORTHOPAEDIC SURGERY

## 2019-12-13 PROCEDURE — 73562 X-RAY EXAM OF KNEE 3: CPT | Performed by: ORTHOPAEDIC SURGERY

## 2019-12-13 PROCEDURE — S0260 H&P FOR SURGERY: HCPCS | Performed by: ORTHOPAEDIC SURGERY

## 2019-12-13 RX ADMIN — METHYLPREDNISOLONE ACETATE 80 MG: 80 INJECTION, SUSPENSION INTRA-ARTICULAR; INTRALESIONAL; INTRAMUSCULAR; SOFT TISSUE at 09:24

## 2019-12-13 NOTE — H&P (VIEW-ONLY)
History & Physical       Patient: Gui Zamora    YOB: 1960    Medical Record Number: [id number]    Chief Complaints: Right knee endstage osteoarthritis, follow-up regarding left knee pain    History of Present Illness: 59 y.o. male presents today in anticipation of upcoming knee replacement surgery.  Patient has a long history of worsening symptoms.  Describes the pain as severe, constant, and typically dull and achy.  Patient has tried and failed prolonged conservative treatment.  Patient is struggling with routine daily activities and this has become a significant issue for overall quality of life.  Patient cannot walk any prolonged distances without having to rest.  Patient presents today in anticipation of a total knee arthroplasty.    He reports that his left knee is also starting to cause him discomfort again.  The last injection helped.  He would like to get the left knee injected again today to hopefully give him some relief before his upcoming surgery.    Allergies: No Known Allergies    Medications:   Home Medications:    Current Outpatient Medications:   •  Chlorhexidine Gluconate (HIBICLENS EX), Apply  topically. AS DIRECTED PREOP, Disp: , Rfl:   •  meloxicam (MOBIC) 15 MG tablet, Take 1 tablet by mouth Daily As Needed for Moderate Pain . (Patient taking differently: Take 15 mg by mouth Daily As Needed for Moderate Pain . PT TO STOP PER MD INSTRUCTION), Disp: 30 tablet, Rfl: 1  •  mupirocin (BACTROBAN) 2 % ointment, Apply  topically to the appropriate area as directed 3 (Three) Times a Day. AS DIRECTED PREOP, Disp: , Rfl:     Past Medical History:   Diagnosis Date   • Arthritis           Past Surgical History:   Procedure Laterality Date   • COLONOSCOPY     • HERNIA REPAIR     • UMBILICAL HERNIA REPAIR            Social History     Occupational History   • Not on file   Tobacco Use   • Smoking status: Never Smoker   • Smokeless tobacco: Never Used   Substance and Sexual Activity  "  • Alcohol use: Yes     Comment: 2 drinks a week    • Drug use: No   • Sexual activity: Not on file      Social History     Social History Narrative   • Not on file          Family History   Problem Relation Age of Onset   • Diabetes Other    • Hypertension Other    • Diabetes Father    • Hypertension Father    • Malig Hyperthermia Neg Hx        Review of Systems:  A 14 point review of systems is reviewed with the patient.  Pertinent positives are listed above.  All others are negative.    Physical Exam: 59 y.o. male    Vitals:    12/13/19 0818   Temp: 99.2 °F (37.3 °C)   Weight: 106 kg (234 lb)   Height: 186.7 cm (73.5\")       General:  Patient is awake and alert.  Appears in no acute distress or discomfort.    Psych:  Affect and demeanor are appropriate.    Eyes:  Conjunctiva and sclera appear grossly normal.  Eyes track well and EOM seem to be intact.    Ears:  No gross abnormalities.  Hearing adequate for the exam.    Cardiovascular:  Regular rate and rhythm.    Lungs:  Good chest expansion.  Breathing unlabored.    Lymph:  No palpable adenopathy about neck or axilla.    Right lower extremity:  Skin benign and intact without evidence for swelling, masses or atrophy.  No palpable masses. Focal tenderness noted over medial joint line.  ROM is from 5 to 110.  Knee is stable on exam.  Good strength throughout the lower leg and foot.  Intact sensation throughout.  Palpable pedal pulses with brisk cap refill.    Diagnostic Tests:  Lab Results   Component Value Date    CALCIUM 9.1 08/01/2018     08/01/2018    K 4.4 08/01/2018    CO2 28.3 08/01/2018     08/01/2018    BUN 23 (H) 08/01/2018    CREATININE 1.01 08/01/2018    EGFRIFAFRI 92 08/01/2018    EGFRIFNONA 76 08/01/2018    BCR 22.8 08/01/2018     Lab Results   Component Value Date    WBC 6.93 12/05/2019    HGB 15.5 12/05/2019    HCT 44.6 12/05/2019    MCV 89.7 12/05/2019     12/05/2019     No results found for: INR, PROTIME    Imaging:  Previous " x-rays of the right knee are reviewed.  Repeat AP, merchant and lateral views of the right knee are ordered and reviewed today for comparison purposes.  He has severe, end-stage osteoarthritis with bone-on-bone degeneration of the medial compartment.  He also has advanced patellofemoral compartment disease and some calcifications laterally.  Left knee has some mild degenerative changes but nothing profound.    Assessment:  1.  Right knee endstage osteoarthritis  2.  Left knee pain, suspected mild arthritis and possible degenerative meniscal tear    Plan: We will plan on proceeding with a right total knee arthroplasty at the patient's request.  I reviewed details of procedure with patient today and discussed all the risks, benefits, alternatives, and limitations of the procedure in laymen's terms with the risks including but not limited to:  neurovascular damage resulting in permanent dysfunction or footdrop and potential need for further surgery, bleeding, infection, hematoma, chronic pain, worsening of pain, persistent symptoms potentially necessitating revision, prosthesis related problems including loosening or allergy, swelling, loss of motion and arthrofibrosis, weakness, stiffness, instability, DVT, pulmonary embolus, death, stroke, complex regional pain syndrome, and need for additional procedures.  Patient verbalized understanding, and was given the opportunity to ask and have all questions answered today.  No guarantees were given regarding results of surgery.      He also wants to get the left knee injected.  The risk, benefits and alternatives were discussed.  He consented and the injection was performed as described below.  He will follow-up as needed for the left knee.    Large Joint Arthrocentesis: L knee  Date/Time: 12/13/2019 9:24 AM  Consent given by: patient  Site marked: site marked  Timeout: Immediately prior to procedure a time out was called to verify the correct patient, procedure, equipment,  support staff and site/side marked as required   Supporting Documentation  Indications: pain and joint swelling   Procedure Details  Location: knee - L knee  Preparation: Patient was prepped and draped in the usual sterile fashion  Needle gauge: 21 G.  Approach: anterolateral  Medications administered: 2 mL lidocaine (cardiac); 80 mg methylPREDNISolone acetate 80 MG/ML  Patient tolerance: patient tolerated the procedure well with no immediate complications        Date: 12/13/2019    Joao Lentz MD

## 2019-12-13 NOTE — PROGRESS NOTES
History & Physical       Patient: Gui Zamora    YOB: 1960    Medical Record Number: [id number]    Chief Complaints: Right knee endstage osteoarthritis, follow-up regarding left knee pain    History of Present Illness: 59 y.o. male presents today in anticipation of upcoming knee replacement surgery.  Patient has a long history of worsening symptoms.  Describes the pain as severe, constant, and typically dull and achy.  Patient has tried and failed prolonged conservative treatment.  Patient is struggling with routine daily activities and this has become a significant issue for overall quality of life.  Patient cannot walk any prolonged distances without having to rest.  Patient presents today in anticipation of a total knee arthroplasty.    He reports that his left knee is also starting to cause him discomfort again.  The last injection helped.  He would like to get the left knee injected again today to hopefully give him some relief before his upcoming surgery.    Allergies: No Known Allergies    Medications:   Home Medications:    Current Outpatient Medications:   •  Chlorhexidine Gluconate (HIBICLENS EX), Apply  topically. AS DIRECTED PREOP, Disp: , Rfl:   •  meloxicam (MOBIC) 15 MG tablet, Take 1 tablet by mouth Daily As Needed for Moderate Pain . (Patient taking differently: Take 15 mg by mouth Daily As Needed for Moderate Pain . PT TO STOP PER MD INSTRUCTION), Disp: 30 tablet, Rfl: 1  •  mupirocin (BACTROBAN) 2 % ointment, Apply  topically to the appropriate area as directed 3 (Three) Times a Day. AS DIRECTED PREOP, Disp: , Rfl:     Past Medical History:   Diagnosis Date   • Arthritis           Past Surgical History:   Procedure Laterality Date   • COLONOSCOPY     • HERNIA REPAIR     • UMBILICAL HERNIA REPAIR            Social History     Occupational History   • Not on file   Tobacco Use   • Smoking status: Never Smoker   • Smokeless tobacco: Never Used   Substance and Sexual Activity  "  • Alcohol use: Yes     Comment: 2 drinks a week    • Drug use: No   • Sexual activity: Not on file      Social History     Social History Narrative   • Not on file          Family History   Problem Relation Age of Onset   • Diabetes Other    • Hypertension Other    • Diabetes Father    • Hypertension Father    • Malig Hyperthermia Neg Hx        Review of Systems:  A 14 point review of systems is reviewed with the patient.  Pertinent positives are listed above.  All others are negative.    Physical Exam: 59 y.o. male    Vitals:    12/13/19 0818   Temp: 99.2 °F (37.3 °C)   Weight: 106 kg (234 lb)   Height: 186.7 cm (73.5\")       General:  Patient is awake and alert.  Appears in no acute distress or discomfort.    Psych:  Affect and demeanor are appropriate.    Eyes:  Conjunctiva and sclera appear grossly normal.  Eyes track well and EOM seem to be intact.    Ears:  No gross abnormalities.  Hearing adequate for the exam.    Cardiovascular:  Regular rate and rhythm.    Lungs:  Good chest expansion.  Breathing unlabored.    Lymph:  No palpable adenopathy about neck or axilla.    Right lower extremity:  Skin benign and intact without evidence for swelling, masses or atrophy.  No palpable masses. Focal tenderness noted over medial joint line.  ROM is from 5 to 110.  Knee is stable on exam.  Good strength throughout the lower leg and foot.  Intact sensation throughout.  Palpable pedal pulses with brisk cap refill.    Diagnostic Tests:  Lab Results   Component Value Date    CALCIUM 9.1 08/01/2018     08/01/2018    K 4.4 08/01/2018    CO2 28.3 08/01/2018     08/01/2018    BUN 23 (H) 08/01/2018    CREATININE 1.01 08/01/2018    EGFRIFAFRI 92 08/01/2018    EGFRIFNONA 76 08/01/2018    BCR 22.8 08/01/2018     Lab Results   Component Value Date    WBC 6.93 12/05/2019    HGB 15.5 12/05/2019    HCT 44.6 12/05/2019    MCV 89.7 12/05/2019     12/05/2019     No results found for: INR, PROTIME    Imaging:  Previous " x-rays of the right knee are reviewed.  Repeat AP, merchant and lateral views of the right knee are ordered and reviewed today for comparison purposes.  He has severe, end-stage osteoarthritis with bone-on-bone degeneration of the medial compartment.  He also has advanced patellofemoral compartment disease and some calcifications laterally.  Left knee has some mild degenerative changes but nothing profound.    Assessment:  1.  Right knee endstage osteoarthritis  2.  Left knee pain, suspected mild arthritis and possible degenerative meniscal tear    Plan: We will plan on proceeding with a right total knee arthroplasty at the patient's request.  I reviewed details of procedure with patient today and discussed all the risks, benefits, alternatives, and limitations of the procedure in laymen's terms with the risks including but not limited to:  neurovascular damage resulting in permanent dysfunction or footdrop and potential need for further surgery, bleeding, infection, hematoma, chronic pain, worsening of pain, persistent symptoms potentially necessitating revision, prosthesis related problems including loosening or allergy, swelling, loss of motion and arthrofibrosis, weakness, stiffness, instability, DVT, pulmonary embolus, death, stroke, complex regional pain syndrome, and need for additional procedures.  Patient verbalized understanding, and was given the opportunity to ask and have all questions answered today.  No guarantees were given regarding results of surgery.      He also wants to get the left knee injected.  The risk, benefits and alternatives were discussed.  He consented and the injection was performed as described below.  He will follow-up as needed for the left knee.    Large Joint Arthrocentesis: L knee  Date/Time: 12/13/2019 9:24 AM  Consent given by: patient  Site marked: site marked  Timeout: Immediately prior to procedure a time out was called to verify the correct patient, procedure, equipment,  support staff and site/side marked as required   Supporting Documentation  Indications: pain and joint swelling   Procedure Details  Location: knee - L knee  Preparation: Patient was prepped and draped in the usual sterile fashion  Needle gauge: 21 G.  Approach: anterolateral  Medications administered: 2 mL lidocaine (cardiac); 80 mg methylPREDNISolone acetate 80 MG/ML  Patient tolerance: patient tolerated the procedure well with no immediate complications        Date: 12/13/2019    Joao Lentz MD

## 2019-12-14 RX ORDER — METHYLPREDNISOLONE ACETATE 80 MG/ML
80 INJECTION, SUSPENSION INTRA-ARTICULAR; INTRALESIONAL; INTRAMUSCULAR; SOFT TISSUE
Status: COMPLETED | OUTPATIENT
Start: 2019-12-13 | End: 2019-12-13

## 2019-12-19 ENCOUNTER — ANESTHESIA EVENT (OUTPATIENT)
Dept: PERIOP | Facility: HOSPITAL | Age: 59
End: 2019-12-19

## 2019-12-19 ENCOUNTER — APPOINTMENT (OUTPATIENT)
Dept: GENERAL RADIOLOGY | Facility: HOSPITAL | Age: 59
End: 2019-12-19

## 2019-12-19 ENCOUNTER — HOSPITAL ENCOUNTER (OUTPATIENT)
Facility: HOSPITAL | Age: 59
Discharge: HOME | End: 2019-12-20
Attending: ORTHOPAEDIC SURGERY | Admitting: ORTHOPAEDIC SURGERY

## 2019-12-19 ENCOUNTER — ANESTHESIA (OUTPATIENT)
Dept: PERIOP | Facility: HOSPITAL | Age: 59
End: 2019-12-19

## 2019-12-19 DIAGNOSIS — Z96.651 S/P TOTAL KNEE REPLACEMENT, RIGHT: Primary | ICD-10-CM

## 2019-12-19 DIAGNOSIS — M17.11 PRIMARY OSTEOARTHRITIS OF RIGHT KNEE: ICD-10-CM

## 2019-12-19 PROCEDURE — 25010000003 CEFAZOLIN IN DEXTROSE 2-4 GM/100ML-% SOLUTION: Performed by: NURSE PRACTITIONER

## 2019-12-19 PROCEDURE — 25010000002 NEOSTIGMINE 0.5 MG/ML SOLUTION: Performed by: NURSE ANESTHETIST, CERTIFIED REGISTERED

## 2019-12-19 PROCEDURE — 25010000002 VANCOMYCIN 10 G RECONSTITUTED SOLUTION: Performed by: ORTHOPAEDIC SURGERY

## 2019-12-19 PROCEDURE — 25010000002 ROPIVACAINE PER 1 MG: Performed by: ANESTHESIOLOGY

## 2019-12-19 PROCEDURE — 25010000002 HYDROMORPHONE PER 4 MG: Performed by: NURSE ANESTHETIST, CERTIFIED REGISTERED

## 2019-12-19 PROCEDURE — 25010000002 MIDAZOLAM PER 1 MG: Performed by: ANESTHESIOLOGY

## 2019-12-19 PROCEDURE — C1776 JOINT DEVICE (IMPLANTABLE): HCPCS | Performed by: ORTHOPAEDIC SURGERY

## 2019-12-19 PROCEDURE — C1713 ANCHOR/SCREW BN/BN,TIS/BN: HCPCS | Performed by: ORTHOPAEDIC SURGERY

## 2019-12-19 PROCEDURE — 97116 GAIT TRAINING THERAPY: CPT

## 2019-12-19 PROCEDURE — 27447 TOTAL KNEE ARTHROPLASTY: CPT | Performed by: ORTHOPAEDIC SURGERY

## 2019-12-19 PROCEDURE — 25010000003 BUPIVACAINE LIPOSOME 1.3 % SUSPENSION 20 ML VIAL: Performed by: ORTHOPAEDIC SURGERY

## 2019-12-19 PROCEDURE — 25010000002 PROPOFOL 10 MG/ML EMULSION: Performed by: NURSE ANESTHETIST, CERTIFIED REGISTERED

## 2019-12-19 PROCEDURE — 25010000003 CEFAZOLIN IN DEXTROSE 2-4 GM/100ML-% SOLUTION: Performed by: NURSE ANESTHETIST, CERTIFIED REGISTERED

## 2019-12-19 PROCEDURE — 25010000002 FENTANYL CITRATE (PF) 100 MCG/2ML SOLUTION: Performed by: ANESTHESIOLOGY

## 2019-12-19 PROCEDURE — G0378 HOSPITAL OBSERVATION PER HR: HCPCS

## 2019-12-19 PROCEDURE — 25010000002 VANCOMYCIN 10 G RECONSTITUTED SOLUTION: Performed by: NURSE PRACTITIONER

## 2019-12-19 PROCEDURE — 25010000003 CEFAZOLIN IN DEXTROSE 2-4 GM/100ML-% SOLUTION: Performed by: ORTHOPAEDIC SURGERY

## 2019-12-19 PROCEDURE — 97161 PT EVAL LOW COMPLEX 20 MIN: CPT

## 2019-12-19 PROCEDURE — 73560 X-RAY EXAM OF KNEE 1 OR 2: CPT

## 2019-12-19 PROCEDURE — 25010000002 ONDANSETRON PER 1 MG: Performed by: NURSE ANESTHETIST, CERTIFIED REGISTERED

## 2019-12-19 PROCEDURE — 25010000002 ATROPINE PER 0.01 MG: Performed by: NURSE ANESTHETIST, CERTIFIED REGISTERED

## 2019-12-19 PROCEDURE — 25010000002 DEXAMETHASONE PER 1 MG: Performed by: NURSE ANESTHETIST, CERTIFIED REGISTERED

## 2019-12-19 PROCEDURE — 25010000002 FENTANYL CITRATE (PF) 100 MCG/2ML SOLUTION: Performed by: NURSE ANESTHETIST, CERTIFIED REGISTERED

## 2019-12-19 PROCEDURE — 97530 THERAPEUTIC ACTIVITIES: CPT

## 2019-12-19 PROCEDURE — C9290 INJ, BUPIVACAINE LIPOSOME: HCPCS | Performed by: ORTHOPAEDIC SURGERY

## 2019-12-19 DEVICE — GENESIS II NON-POROUS TIBIAL                                    BASEPLATE SIZE 6 RIGHT
Type: IMPLANTABLE DEVICE | Site: KNEE | Status: FUNCTIONAL
Brand: GENESIS II

## 2019-12-19 DEVICE — CMT BONE PALACOS R HI/VISC 1X40: Type: IMPLANTABLE DEVICE | Site: KNEE | Status: FUNCTIONAL

## 2019-12-19 DEVICE — GEN II RESURFACING PATELLA 38MM
Type: IMPLANTABLE DEVICE | Site: KNEE | Status: FUNCTIONAL
Brand: GENESIS II

## 2019-12-19 DEVICE — LEGION CR HIGH FLEX XLPE SZ 5-6 10MM
Type: IMPLANTABLE DEVICE | Site: KNEE | Status: FUNCTIONAL
Brand: LEGION

## 2019-12-19 DEVICE — LEGION CRUCIATE RETAINING OXINIUM                                    FEMORAL SIZE 6 RIGHT
Type: IMPLANTABLE DEVICE | Site: KNEE | Status: FUNCTIONAL
Brand: LEGION

## 2019-12-19 DEVICE — IMPLANTABLE DEVICE: Type: IMPLANTABLE DEVICE | Site: KNEE | Status: FUNCTIONAL

## 2019-12-19 RX ORDER — HYDROCODONE BITARTRATE AND ACETAMINOPHEN 7.5; 325 MG/1; MG/1
2 TABLET ORAL EVERY 4 HOURS PRN
Status: DISCONTINUED | OUTPATIENT
Start: 2019-12-19 | End: 2019-12-20 | Stop reason: HOSPADM

## 2019-12-19 RX ORDER — MELOXICAM 15 MG/1
15 TABLET ORAL DAILY
Status: DISCONTINUED | OUTPATIENT
Start: 2019-12-19 | End: 2019-12-20 | Stop reason: HOSPADM

## 2019-12-19 RX ORDER — PROMETHAZINE HYDROCHLORIDE 25 MG/ML
12.5 INJECTION, SOLUTION INTRAMUSCULAR; INTRAVENOUS ONCE AS NEEDED
Status: DISCONTINUED | OUTPATIENT
Start: 2019-12-19 | End: 2019-12-19 | Stop reason: HOSPADM

## 2019-12-19 RX ORDER — ROCURONIUM BROMIDE 10 MG/ML
INJECTION, SOLUTION INTRAVENOUS AS NEEDED
Status: DISCONTINUED | OUTPATIENT
Start: 2019-12-19 | End: 2019-12-19 | Stop reason: SURG

## 2019-12-19 RX ORDER — ATROPINE SULFATE 0.4 MG/ML
AMPUL (ML) INJECTION AS NEEDED
Status: DISCONTINUED | OUTPATIENT
Start: 2019-12-19 | End: 2019-12-19 | Stop reason: SURG

## 2019-12-19 RX ORDER — SODIUM CHLORIDE, SODIUM LACTATE, POTASSIUM CHLORIDE, CALCIUM CHLORIDE 600; 310; 30; 20 MG/100ML; MG/100ML; MG/100ML; MG/100ML
100 INJECTION, SOLUTION INTRAVENOUS CONTINUOUS
Status: DISCONTINUED | OUTPATIENT
Start: 2019-12-19 | End: 2019-12-20 | Stop reason: HOSPADM

## 2019-12-19 RX ORDER — DIPHENHYDRAMINE HYDROCHLORIDE 50 MG/ML
12.5 INJECTION INTRAMUSCULAR; INTRAVENOUS
Status: DISCONTINUED | OUTPATIENT
Start: 2019-12-19 | End: 2019-12-19 | Stop reason: HOSPADM

## 2019-12-19 RX ORDER — FENTANYL CITRATE 50 UG/ML
INJECTION, SOLUTION INTRAMUSCULAR; INTRAVENOUS AS NEEDED
Status: DISCONTINUED | OUTPATIENT
Start: 2019-12-19 | End: 2019-12-19 | Stop reason: SURG

## 2019-12-19 RX ORDER — ONDANSETRON 2 MG/ML
INJECTION INTRAMUSCULAR; INTRAVENOUS AS NEEDED
Status: DISCONTINUED | OUTPATIENT
Start: 2019-12-19 | End: 2019-12-19 | Stop reason: SURG

## 2019-12-19 RX ORDER — CEFAZOLIN SODIUM 2 G/100ML
INJECTION, SOLUTION INTRAVENOUS AS NEEDED
Status: DISCONTINUED | OUTPATIENT
Start: 2019-12-19 | End: 2019-12-19 | Stop reason: SURG

## 2019-12-19 RX ORDER — ASPIRIN 81 MG/1
81 TABLET ORAL 2 TIMES DAILY
Status: DISCONTINUED | OUTPATIENT
Start: 2019-12-19 | End: 2019-12-20 | Stop reason: HOSPADM

## 2019-12-19 RX ORDER — HYDROMORPHONE HYDROCHLORIDE 1 MG/ML
0.5 INJECTION, SOLUTION INTRAMUSCULAR; INTRAVENOUS; SUBCUTANEOUS
Status: DISCONTINUED | OUTPATIENT
Start: 2019-12-19 | End: 2019-12-19 | Stop reason: HOSPADM

## 2019-12-19 RX ORDER — EPHEDRINE SULFATE 50 MG/ML
5 INJECTION, SOLUTION INTRAVENOUS ONCE AS NEEDED
Status: DISCONTINUED | OUTPATIENT
Start: 2019-12-19 | End: 2019-12-19 | Stop reason: HOSPADM

## 2019-12-19 RX ORDER — PROMETHAZINE HYDROCHLORIDE 25 MG/1
25 TABLET ORAL ONCE AS NEEDED
Status: DISCONTINUED | OUTPATIENT
Start: 2019-12-19 | End: 2019-12-19 | Stop reason: HOSPADM

## 2019-12-19 RX ORDER — MIDAZOLAM HYDROCHLORIDE 1 MG/ML
2 INJECTION INTRAMUSCULAR; INTRAVENOUS
Status: DISCONTINUED | OUTPATIENT
Start: 2019-12-19 | End: 2019-12-19 | Stop reason: HOSPADM

## 2019-12-19 RX ORDER — LIDOCAINE HYDROCHLORIDE 10 MG/ML
0.5 INJECTION, SOLUTION EPIDURAL; INFILTRATION; INTRACAUDAL; PERINEURAL ONCE AS NEEDED
Status: DISCONTINUED | OUTPATIENT
Start: 2019-12-19 | End: 2019-12-19 | Stop reason: HOSPADM

## 2019-12-19 RX ORDER — FENTANYL CITRATE 50 UG/ML
50 INJECTION, SOLUTION INTRAMUSCULAR; INTRAVENOUS
Status: DISCONTINUED | OUTPATIENT
Start: 2019-12-19 | End: 2019-12-19 | Stop reason: HOSPADM

## 2019-12-19 RX ORDER — DIPHENHYDRAMINE HCL 25 MG
25 CAPSULE ORAL
Status: DISCONTINUED | OUTPATIENT
Start: 2019-12-19 | End: 2019-12-19 | Stop reason: HOSPADM

## 2019-12-19 RX ORDER — GLYCOPYRROLATE 0.2 MG/ML
INJECTION INTRAMUSCULAR; INTRAVENOUS AS NEEDED
Status: DISCONTINUED | OUTPATIENT
Start: 2019-12-19 | End: 2019-12-19 | Stop reason: SURG

## 2019-12-19 RX ORDER — BISACODYL 10 MG
10 SUPPOSITORY, RECTAL RECTAL DAILY PRN
Status: DISCONTINUED | OUTPATIENT
Start: 2019-12-19 | End: 2019-12-20 | Stop reason: HOSPADM

## 2019-12-19 RX ORDER — ONDANSETRON 2 MG/ML
4 INJECTION INTRAMUSCULAR; INTRAVENOUS EVERY 6 HOURS PRN
Status: DISCONTINUED | OUTPATIENT
Start: 2019-12-19 | End: 2019-12-20 | Stop reason: HOSPADM

## 2019-12-19 RX ORDER — CEFAZOLIN SODIUM 2 G/100ML
2 INJECTION, SOLUTION INTRAVENOUS EVERY 8 HOURS
Status: COMPLETED | OUTPATIENT
Start: 2019-12-19 | End: 2019-12-20

## 2019-12-19 RX ORDER — PROMETHAZINE HYDROCHLORIDE 25 MG/1
25 SUPPOSITORY RECTAL ONCE AS NEEDED
Status: DISCONTINUED | OUTPATIENT
Start: 2019-12-19 | End: 2019-12-19 | Stop reason: HOSPADM

## 2019-12-19 RX ORDER — WOUND DRESSING ADHESIVE - LIQUID
LIQUID MISCELLANEOUS AS NEEDED
Status: DISCONTINUED | OUTPATIENT
Start: 2019-12-19 | End: 2019-12-19 | Stop reason: HOSPADM

## 2019-12-19 RX ORDER — HYDRALAZINE HYDROCHLORIDE 20 MG/ML
5 INJECTION INTRAMUSCULAR; INTRAVENOUS
Status: DISCONTINUED | OUTPATIENT
Start: 2019-12-19 | End: 2019-12-19 | Stop reason: HOSPADM

## 2019-12-19 RX ORDER — PROPOFOL 10 MG/ML
VIAL (ML) INTRAVENOUS AS NEEDED
Status: DISCONTINUED | OUTPATIENT
Start: 2019-12-19 | End: 2019-12-19 | Stop reason: SURG

## 2019-12-19 RX ORDER — SODIUM CHLORIDE, SODIUM LACTATE, POTASSIUM CHLORIDE, CALCIUM CHLORIDE 600; 310; 30; 20 MG/100ML; MG/100ML; MG/100ML; MG/100ML
9 INJECTION, SOLUTION INTRAVENOUS CONTINUOUS
Status: DISCONTINUED | OUTPATIENT
Start: 2019-12-19 | End: 2019-12-19 | Stop reason: HOSPADM

## 2019-12-19 RX ORDER — PROMETHAZINE HYDROCHLORIDE 25 MG/ML
6.25 INJECTION, SOLUTION INTRAMUSCULAR; INTRAVENOUS
Status: DISCONTINUED | OUTPATIENT
Start: 2019-12-19 | End: 2019-12-19 | Stop reason: HOSPADM

## 2019-12-19 RX ORDER — HYDROCODONE BITARTRATE AND ACETAMINOPHEN 7.5; 325 MG/1; MG/1
1 TABLET ORAL EVERY 4 HOURS PRN
Status: DISCONTINUED | OUTPATIENT
Start: 2019-12-19 | End: 2019-12-20 | Stop reason: HOSPADM

## 2019-12-19 RX ORDER — MAGNESIUM HYDROXIDE 1200 MG/15ML
LIQUID ORAL AS NEEDED
Status: DISCONTINUED | OUTPATIENT
Start: 2019-12-19 | End: 2019-12-19 | Stop reason: HOSPADM

## 2019-12-19 RX ORDER — FAMOTIDINE 10 MG/ML
20 INJECTION, SOLUTION INTRAVENOUS ONCE
Status: COMPLETED | OUTPATIENT
Start: 2019-12-19 | End: 2019-12-19

## 2019-12-19 RX ORDER — SODIUM CHLORIDE 0.9 % (FLUSH) 0.9 %
3 SYRINGE (ML) INJECTION EVERY 12 HOURS SCHEDULED
Status: DISCONTINUED | OUTPATIENT
Start: 2019-12-19 | End: 2019-12-19 | Stop reason: HOSPADM

## 2019-12-19 RX ORDER — ONDANSETRON 2 MG/ML
4 INJECTION INTRAMUSCULAR; INTRAVENOUS ONCE AS NEEDED
Status: COMPLETED | OUTPATIENT
Start: 2019-12-19 | End: 2019-12-19

## 2019-12-19 RX ORDER — ONDANSETRON 4 MG/1
4 TABLET, FILM COATED ORAL EVERY 6 HOURS PRN
Status: DISCONTINUED | OUTPATIENT
Start: 2019-12-19 | End: 2019-12-20 | Stop reason: HOSPADM

## 2019-12-19 RX ORDER — HYDROCODONE BITARTRATE AND ACETAMINOPHEN 7.5; 325 MG/1; MG/1
1 TABLET ORAL ONCE AS NEEDED
Status: DISCONTINUED | OUTPATIENT
Start: 2019-12-19 | End: 2019-12-19 | Stop reason: HOSPADM

## 2019-12-19 RX ORDER — TRANEXAMIC ACID 100 MG/ML
INJECTION, SOLUTION INTRAVENOUS AS NEEDED
Status: DISCONTINUED | OUTPATIENT
Start: 2019-12-19 | End: 2019-12-19 | Stop reason: SURG

## 2019-12-19 RX ORDER — DEXAMETHASONE SODIUM PHOSPHATE 4 MG/ML
INJECTION, SOLUTION INTRA-ARTICULAR; INTRALESIONAL; INTRAMUSCULAR; INTRAVENOUS; SOFT TISSUE AS NEEDED
Status: DISCONTINUED | OUTPATIENT
Start: 2019-12-19 | End: 2019-12-19 | Stop reason: SURG

## 2019-12-19 RX ORDER — OXYCODONE AND ACETAMINOPHEN 7.5; 325 MG/1; MG/1
1 TABLET ORAL ONCE AS NEEDED
Status: DISCONTINUED | OUTPATIENT
Start: 2019-12-19 | End: 2019-12-19 | Stop reason: HOSPADM

## 2019-12-19 RX ORDER — PREGABALIN 75 MG/1
150 CAPSULE ORAL ONCE
Status: COMPLETED | OUTPATIENT
Start: 2019-12-19 | End: 2019-12-19

## 2019-12-19 RX ORDER — HYDROMORPHONE HYDROCHLORIDE 1 MG/ML
0.5 INJECTION, SOLUTION INTRAMUSCULAR; INTRAVENOUS; SUBCUTANEOUS
Status: DISCONTINUED | OUTPATIENT
Start: 2019-12-19 | End: 2019-12-20 | Stop reason: HOSPADM

## 2019-12-19 RX ORDER — CEFAZOLIN SODIUM 2 G/100ML
2 INJECTION, SOLUTION INTRAVENOUS ONCE
Status: COMPLETED | OUTPATIENT
Start: 2019-12-19 | End: 2019-12-19

## 2019-12-19 RX ORDER — NALOXONE HCL 0.4 MG/ML
0.2 VIAL (ML) INJECTION AS NEEDED
Status: DISCONTINUED | OUTPATIENT
Start: 2019-12-19 | End: 2019-12-19 | Stop reason: HOSPADM

## 2019-12-19 RX ORDER — ACETAMINOPHEN 500 MG
1000 TABLET ORAL ONCE
Status: COMPLETED | OUTPATIENT
Start: 2019-12-19 | End: 2019-12-19

## 2019-12-19 RX ORDER — SODIUM CHLORIDE 0.9 % (FLUSH) 0.9 %
3 SYRINGE (ML) INJECTION EVERY 12 HOURS SCHEDULED
Status: DISCONTINUED | OUTPATIENT
Start: 2019-12-19 | End: 2019-12-20 | Stop reason: HOSPADM

## 2019-12-19 RX ORDER — ROPIVACAINE HYDROCHLORIDE 5 MG/ML
INJECTION, SOLUTION EPIDURAL; INFILTRATION; PERINEURAL
Status: COMPLETED | OUTPATIENT
Start: 2019-12-19 | End: 2019-12-19

## 2019-12-19 RX ORDER — LIDOCAINE HYDROCHLORIDE 20 MG/ML
INJECTION, SOLUTION INFILTRATION; PERINEURAL AS NEEDED
Status: DISCONTINUED | OUTPATIENT
Start: 2019-12-19 | End: 2019-12-19 | Stop reason: SURG

## 2019-12-19 RX ORDER — SODIUM CHLORIDE 0.9 % (FLUSH) 0.9 %
10 SYRINGE (ML) INJECTION AS NEEDED
Status: DISCONTINUED | OUTPATIENT
Start: 2019-12-19 | End: 2019-12-20 | Stop reason: HOSPADM

## 2019-12-19 RX ORDER — MELOXICAM 15 MG/1
15 TABLET ORAL ONCE
Status: COMPLETED | OUTPATIENT
Start: 2019-12-19 | End: 2019-12-19

## 2019-12-19 RX ORDER — HYDROMORPHONE HCL 110MG/55ML
PATIENT CONTROLLED ANALGESIA SYRINGE INTRAVENOUS AS NEEDED
Status: DISCONTINUED | OUTPATIENT
Start: 2019-12-19 | End: 2019-12-19 | Stop reason: SURG

## 2019-12-19 RX ORDER — MIDAZOLAM HYDROCHLORIDE 1 MG/ML
1 INJECTION INTRAMUSCULAR; INTRAVENOUS
Status: DISCONTINUED | OUTPATIENT
Start: 2019-12-19 | End: 2019-12-19 | Stop reason: HOSPADM

## 2019-12-19 RX ORDER — SODIUM CHLORIDE 0.9 % (FLUSH) 0.9 %
3-10 SYRINGE (ML) INJECTION AS NEEDED
Status: DISCONTINUED | OUTPATIENT
Start: 2019-12-19 | End: 2019-12-19 | Stop reason: HOSPADM

## 2019-12-19 RX ORDER — DOCUSATE SODIUM 100 MG/1
100 CAPSULE, LIQUID FILLED ORAL 2 TIMES DAILY
Status: DISCONTINUED | OUTPATIENT
Start: 2019-12-19 | End: 2019-12-20 | Stop reason: HOSPADM

## 2019-12-19 RX ORDER — NALOXONE HCL 0.4 MG/ML
0.1 VIAL (ML) INJECTION
Status: DISCONTINUED | OUTPATIENT
Start: 2019-12-19 | End: 2019-12-20 | Stop reason: HOSPADM

## 2019-12-19 RX ORDER — FLUMAZENIL 0.1 MG/ML
0.2 INJECTION INTRAVENOUS AS NEEDED
Status: DISCONTINUED | OUTPATIENT
Start: 2019-12-19 | End: 2019-12-19 | Stop reason: HOSPADM

## 2019-12-19 RX ORDER — LABETALOL HYDROCHLORIDE 5 MG/ML
5 INJECTION, SOLUTION INTRAVENOUS
Status: DISCONTINUED | OUTPATIENT
Start: 2019-12-19 | End: 2019-12-19 | Stop reason: HOSPADM

## 2019-12-19 RX ORDER — ACETAMINOPHEN 325 MG/1
650 TABLET ORAL EVERY 4 HOURS PRN
Status: DISCONTINUED | OUTPATIENT
Start: 2019-12-19 | End: 2019-12-20 | Stop reason: HOSPADM

## 2019-12-19 RX ORDER — ACETAMINOPHEN 325 MG/1
650 TABLET ORAL ONCE AS NEEDED
Status: DISCONTINUED | OUTPATIENT
Start: 2019-12-19 | End: 2019-12-19 | Stop reason: HOSPADM

## 2019-12-19 RX ADMIN — ROCURONIUM BROMIDE 50 MG: 10 INJECTION INTRAVENOUS at 07:08

## 2019-12-19 RX ADMIN — HYDROCODONE BITARTRATE AND ACETAMINOPHEN 1 TABLET: 7.5; 325 TABLET ORAL at 21:01

## 2019-12-19 RX ADMIN — FENTANYL CITRATE 50 MCG: 50 INJECTION INTRAMUSCULAR; INTRAVENOUS at 08:09

## 2019-12-19 RX ADMIN — ASPIRIN 81 MG: 81 TABLET, COATED ORAL at 14:19

## 2019-12-19 RX ADMIN — NEOSTIGMINE METHYLSULFATE 2 MG: 5 INJECTION, SOLUTION INTRAMUSCULAR; INTRAVENOUS; SUBCUTANEOUS at 08:35

## 2019-12-19 RX ADMIN — ATROPINE SULFATE 0.2 MG: 0.4 INJECTION, SOLUTION INTRAMUSCULAR; INTRAVENOUS; SUBCUTANEOUS at 07:43

## 2019-12-19 RX ADMIN — EPHEDRINE SULFATE 10 MG: 50 INJECTION INTRAMUSCULAR; INTRAVENOUS; SUBCUTANEOUS at 07:43

## 2019-12-19 RX ADMIN — ASPIRIN 81 MG: 81 TABLET, COATED ORAL at 21:01

## 2019-12-19 RX ADMIN — FENTANYL CITRATE 50 MCG: 50 INJECTION INTRAMUSCULAR; INTRAVENOUS at 07:11

## 2019-12-19 RX ADMIN — TRANEXAMIC ACID 1000 MG: 100 INJECTION, SOLUTION INTRAVENOUS at 08:30

## 2019-12-19 RX ADMIN — LIDOCAINE HYDROCHLORIDE 80 MG: 20 INJECTION, SOLUTION INFILTRATION; PERINEURAL at 07:08

## 2019-12-19 RX ADMIN — CEFAZOLIN SODIUM 2 G: 2 INJECTION, SOLUTION INTRAVENOUS at 07:14

## 2019-12-19 RX ADMIN — SODIUM CHLORIDE, POTASSIUM CHLORIDE, SODIUM LACTATE AND CALCIUM CHLORIDE 9 ML/HR: 600; 310; 30; 20 INJECTION, SOLUTION INTRAVENOUS at 06:43

## 2019-12-19 RX ADMIN — MELOXICAM 15 MG: 15 TABLET ORAL at 14:19

## 2019-12-19 RX ADMIN — HYDROMORPHONE HYDROCHLORIDE 0.5 MG: 2 INJECTION, SOLUTION INTRAMUSCULAR; INTRAVENOUS; SUBCUTANEOUS at 07:25

## 2019-12-19 RX ADMIN — MIDAZOLAM 2 MG: 1 INJECTION INTRAMUSCULAR; INTRAVENOUS at 06:47

## 2019-12-19 RX ADMIN — VANCOMYCIN HYDROCHLORIDE 1500 MG: 10 INJECTION, POWDER, LYOPHILIZED, FOR SOLUTION INTRAVENOUS at 18:06

## 2019-12-19 RX ADMIN — HYDROMORPHONE HYDROCHLORIDE 0.5 MG: 1 INJECTION, SOLUTION INTRAMUSCULAR; INTRAVENOUS; SUBCUTANEOUS at 10:10

## 2019-12-19 RX ADMIN — MELOXICAM 15 MG: 15 TABLET ORAL at 06:08

## 2019-12-19 RX ADMIN — PROPOFOL 200 MG: 10 INJECTION, EMULSION INTRAVENOUS at 07:08

## 2019-12-19 RX ADMIN — SODIUM CHLORIDE, POTASSIUM CHLORIDE, SODIUM LACTATE AND CALCIUM CHLORIDE 500 ML: 600; 310; 30; 20 INJECTION, SOLUTION INTRAVENOUS at 06:09

## 2019-12-19 RX ADMIN — DEXAMETHASONE SODIUM PHOSPHATE 8 MG: 4 INJECTION INTRA-ARTICULAR; INTRALESIONAL; INTRAMUSCULAR; INTRAVENOUS; SOFT TISSUE at 07:21

## 2019-12-19 RX ADMIN — ONDANSETRON HYDROCHLORIDE 4 MG: 2 SOLUTION INTRAMUSCULAR; INTRAVENOUS at 08:31

## 2019-12-19 RX ADMIN — HYDROMORPHONE HYDROCHLORIDE 0.5 MG: 1 INJECTION, SOLUTION INTRAMUSCULAR; INTRAVENOUS; SUBCUTANEOUS at 09:43

## 2019-12-19 RX ADMIN — VANCOMYCIN HYDROCHLORIDE 1500 MG: 10 INJECTION, POWDER, LYOPHILIZED, FOR SOLUTION INTRAVENOUS at 06:30

## 2019-12-19 RX ADMIN — CEFAZOLIN SODIUM 2 G: 2 INJECTION, SOLUTION INTRAVENOUS at 23:04

## 2019-12-19 RX ADMIN — SODIUM CHLORIDE, POTASSIUM CHLORIDE, SODIUM LACTATE AND CALCIUM CHLORIDE: 600; 310; 30; 20 INJECTION, SOLUTION INTRAVENOUS at 08:03

## 2019-12-19 RX ADMIN — ROPIVACAINE HYDROCHLORIDE 30 ML: 5 INJECTION, SOLUTION EPIDURAL; INFILTRATION; PERINEURAL at 06:52

## 2019-12-19 RX ADMIN — ATROPINE SULFATE 0.3 MG: 0.4 INJECTION, SOLUTION INTRAMUSCULAR; INTRAVENOUS; SUBCUTANEOUS at 08:35

## 2019-12-19 RX ADMIN — ONDANSETRON 4 MG: 2 INJECTION INTRAMUSCULAR; INTRAVENOUS at 09:43

## 2019-12-19 RX ADMIN — FENTANYL CITRATE 50 MCG: 50 INJECTION INTRAMUSCULAR; INTRAVENOUS at 07:08

## 2019-12-19 RX ADMIN — DOCUSATE SODIUM 100 MG: 100 CAPSULE, LIQUID FILLED ORAL at 21:02

## 2019-12-19 RX ADMIN — FENTANYL CITRATE 50 MCG: 50 INJECTION, SOLUTION INTRAMUSCULAR; INTRAVENOUS at 06:47

## 2019-12-19 RX ADMIN — FENTANYL CITRATE 50 MCG: 50 INJECTION INTRAMUSCULAR; INTRAVENOUS at 08:06

## 2019-12-19 RX ADMIN — CEFAZOLIN SODIUM 2 G: 2 INJECTION, SOLUTION INTRAVENOUS at 07:15

## 2019-12-19 RX ADMIN — ACETAMINOPHEN 1000 MG: 500 TABLET, FILM COATED ORAL at 06:08

## 2019-12-19 RX ADMIN — HYDROCODONE BITARTRATE AND ACETAMINOPHEN 1 TABLET: 7.5; 325 TABLET ORAL at 12:26

## 2019-12-19 RX ADMIN — GLYCOPYRROLATE 0.2 MG: 0.2 INJECTION INTRAMUSCULAR; INTRAVENOUS at 07:38

## 2019-12-19 RX ADMIN — FAMOTIDINE 20 MG: 10 INJECTION INTRAVENOUS at 06:43

## 2019-12-19 RX ADMIN — FENTANYL CITRATE 50 MCG: 50 INJECTION, SOLUTION INTRAMUSCULAR; INTRAVENOUS at 09:30

## 2019-12-19 RX ADMIN — FENTANYL CITRATE 50 MCG: 50 INJECTION, SOLUTION INTRAMUSCULAR; INTRAVENOUS at 09:17

## 2019-12-19 RX ADMIN — MUPIROCIN 1 APPLICATION: 20 OINTMENT TOPICAL at 21:03

## 2019-12-19 RX ADMIN — SODIUM CHLORIDE, PRESERVATIVE FREE 3 ML: 5 INJECTION INTRAVENOUS at 21:02

## 2019-12-19 RX ADMIN — HYDROCODONE BITARTRATE AND ACETAMINOPHEN 1 TABLET: 7.5; 325 TABLET ORAL at 16:38

## 2019-12-19 RX ADMIN — PREGABALIN 150 MG: 75 CAPSULE ORAL at 06:08

## 2019-12-19 RX ADMIN — HYDROMORPHONE HYDROCHLORIDE 0.5 MG: 2 INJECTION, SOLUTION INTRAMUSCULAR; INTRAVENOUS; SUBCUTANEOUS at 07:50

## 2019-12-19 RX ADMIN — CEFAZOLIN SODIUM 2 G: 2 INJECTION, SOLUTION INTRAVENOUS at 16:00

## 2019-12-19 NOTE — ANESTHESIA POSTPROCEDURE EVALUATION
"Patient: Gui Zamora    Procedure Summary     Date:  12/19/19 Room / Location:  [facility] OR [address] [place] [address] MAIN OR    Anesthesia Start:  0702 Anesthesia Stop:  0905    Procedure:  TOTAL KNEE ARTHROPLASTY (Right Knee) Diagnosis:       Primary osteoarthritis of right knee      (Primary osteoarthritis of right knee [M17.11])    Surgeon:  Joao Lentz MD Provider:  Gretta Thorne MD    Anesthesia Type:  general with block ASA Status:  2          Anesthesia Type: general with block    Vitals  Vitals Value Taken Time   /88 12/19/2019 10:15 AM   Temp 36.6 °C (97.9 °F) 12/19/2019 10:00 AM   Pulse 60 12/19/2019 10:18 AM   Resp 16 12/19/2019 10:15 AM   SpO2 98 % 12/19/2019 10:18 AM   Vitals shown include unvalidated device data.        Post Anesthesia Care and Evaluation    Patient location during evaluation: bedside  Patient participation: complete - patient participated  Level of consciousness: awake and alert  Pain management: adequate  Airway patency: patent  Anesthetic complications: No anesthetic complications    Cardiovascular status: acceptable  Respiratory status: acceptable  Hydration status: acceptable    Comments: /88   Pulse 59   Temp 36.6 °C (97.9 °F) (Oral)   Resp 16   Ht 186.7 cm (73.5\")   Wt 104 kg (229 lb 0.9 oz)   SpO2 98%   BMI 29.81 kg/m²       "

## 2019-12-19 NOTE — ADDENDUM NOTE
Addendum  created 12/19/19 1449 by Gretta Thorne MD    Attestation recorded in Intraprocedure, Intraprocedure Attestations filed

## 2019-12-19 NOTE — BRIEF OP NOTE
TOTAL KNEE ARTHROPLASTY  Progress Note    Gui Zamora  12/19/2019    Pre-op Diagnosis:   Primary osteoarthritis of right knee [M17.11]       Post-Op Diagnosis Codes:     * Primary osteoarthritis of right knee [M17.11]    Procedure/CPT® Codes:      Procedure(s):  TOTAL KNEE ARTHROPLASTY    Surgeon(s):  Joao Lentz MD    Anesthesia: General with Block    Staff:   Circulator: Zohra Roche RN  Scrub Person: Lennie Maldonado  Vendor Representative: Igor Ibarra  Assistant: Leobardo Mcgregor CSA    Estimated Blood Loss: 50 mL    Urine Voided: 0 mL    Specimens:                None          Drains: * No LDAs found *    Findings: see dictation    Complications: none      Joao Lentz MD     Date: 12/19/2019  Time: 8:54 AM

## 2019-12-19 NOTE — THERAPY EVALUATION
Patient Name: Gui Zamora  : 1960    MRN: [id number]                              Today's Date: 2019       Admit Date: 2019    Visit Dx:     ICD-10-CM ICD-9-CM   1. Primary osteoarthritis of right knee M17.11 715.16     Patient Active Problem List   Diagnosis   • Primary osteoarthritis of right knee   • Plantar fasciitis   • Impingement syndrome of left ankle   • Pes planus     Past Medical History:   Diagnosis Date   • Arthritis      Past Surgical History:   Procedure Laterality Date   • COLONOSCOPY     • HERNIA REPAIR     • UMBILICAL HERNIA REPAIR       General Information     Row Name 19 1427          PT Evaluation Time/Intention    Document Type  evaluation  -AE     Mode of Treatment  physical therapy  -AE     Row Name 19 1427          General Information    Patient Profile Reviewed?  yes  -AE     Prior Level of Function  independent:  -AE     Row Name 19 1427          Relationship/Environment    Lives With  spouse  -AE     Row Name 19 1427          Resource/Environmental Concerns    Current Living Arrangements  home/apartment/condo  -AE     Row Name 19 1427          Home Main Entrance    Number of Stairs, Main Entrance  five  -AE     Stair Railings, Main Entrance  railing on left side (ascending);railings safe and in good condition  -AE     Row Name 19 1427          Stairs Within Home, Primary    Number of Stairs, Within Home, Primary  four 4 steps down into main living area with L descending rail  -AE     Stair Railings, Within Home, Primary  railing on right side (ascending)  -AE     Row Name 19 1427          Cognitive Assessment/Intervention- PT/OT    Orientation Status (Cognition)  oriented x 3  -AE     Row Name 19 1427          Safety Issues, Functional Mobility    Safety Issues Affecting Function (Mobility)  positioning of assistive device  -AE     Impairments Affecting Function (Mobility)  balance;range of motion (ROM);strength   -AE       User Key  (r) = Recorded By, (t) = Taken By, (c) = Cosigned By    Initials Name Provider Type    AE Richelle Bennett, TAHIR Physical Therapist        Mobility     Row Name 12/19/19 1428          Bed Mobility Assessment/Treatment    Bed Mobility Assessment/Treatment  bed mobility (all) activities  -AE     [place] Level (Bed Mobility)  supervision  -AE     Assistive Device (Bed Mobility)  bed rails  -AE     Row Name 12/19/19 1428          Transfer Assessment/Treatment    Comment (Transfers)  pt is CGA for all transfers with FWW  -AE     Row Name 12/19/19 1428          Bed-Chair Transfer    Bed-Chair [place] (Transfers)  contact guard  -AE     Assistive Device (Bed-Chair Transfers)  walker, front-wheeled  -AE     Row Name 12/19/19 1428          Sit-Stand Transfer    Sit-Stand [place] (Transfers)  contact guard  -AE     Assistive Device (Sit-Stand Transfers)  walker, front-wheeled  -AE     Row Name 12/19/19 1428          Gait/Stairs Assessment/Training    Gait/Stairs Assessment/Training  gait/ambulation independence;gait/ambulation assistive device  -AE     [place] Level (Gait)  stand by assist  -AE     Assistive Device (Gait)  walker, front-wheeled  -AE     Distance in Feet (Gait)  100 ft  -AE     Pattern (Gait)  step-through  -AE     Bilateral Gait Deviations  forward flexed posture  -AE     Right Sided Gait Deviations  weight shift ability decreased;heel strike decreased  -AE     Negotiation (Stairs)  stairs independence;stairs assistive device;handrail location;number of steps  -AE     [place] Level (Stairs)  stand by assist  -AE     Handrail Location (Stairs)  right side (ascending);left side (ascending)  -AE     Number of Steps (Stairs)  ascended 4 steps L ascending rail; descended 4 steps L descending rail  -AE     Ascending Technique (Stairs)  step-to-step  -AE     Descending Technique (Stairs)  step-to-step  -AE       User Key  (r) = Recorded By, (t) = Taken By, (c) = Cosigned  By    Initials Name Provider Type    AE Richelle Bennett, PT Physical Therapist        Obj/Interventions     Row Name 12/19/19 1430          General ROM    GENERAL ROM COMMENTS  R knee ROM ~5-90 deg  -AE     Row Name 12/19/19 1430          MMT (Manual Muscle Testing)    General MMT Comments  generalized weakness  -AE     Row Name 12/19/19 1430          Therapeutic Exercise    Comment (Therapeutic Exercise)  TKA protocol x 5  -AE       User Key  (r) = Recorded By, (t) = Taken By, (c) = Cosigned By    Initials Name Provider Type    AE Richelle Bennett PT Physical Therapist        Goals/Plan     Row Name 12/19/19 1431          Bed Mobility Goal 1 (PT)    Activity/Assistive Device (Bed Mobility Goal 1, PT)  bed mobility activities, all  -AE     [place] Level/Cues Needed (Bed Mobility Goal 1, PT)  conditional independence  -AE     Time Frame (Bed Mobility Goal 1, PT)  1 week  -AE     Progress/Outcomes (Bed Mobility Goal 1, PT)  continuing progress toward goal  -AE     Row Name 12/19/19 1431          Transfer Goal 1 (PT)    Activity/Assistive Device (Transfer Goal 1, PT)  transfers, all  -AE     [place] Level/Cues Needed (Transfer Goal 1, PT)  conditional independence  -AE     Time Frame (Transfer Goal 1, PT)  1 week  -AE     Progress/Outcome (Transfer Goal 1, PT)  continuing progress toward goal  -AE     Row Name 12/19/19 1431          Gait Training Goal 1 (PT)    Activity/Assistive Device (Gait Training Goal 1, PT)  gait (walking locomotion);assistive device use  -AE     [place] Level (Gait Training Goal 1, PT)  conditional independence  -AE     Distance (Gait Goal 1, PT)  200 ft  -AE     Time Frame (Gait Training Goal 1, PT)  1 week  -AE     Progress/Outcome (Gait Training Goal 1, PT)  continuing progress toward goal  -AE     Row Name 12/19/19 1431          ROM Goal 1 (PT)    ROM Goal 1 (PT)  0-90 R knee ROM  -AE     Time Frame (ROM Goal 1, PT)  1 week  -AE     Progress/Outcome (ROM Goal 1, PT)   continuing progress toward goal  -AE       User Key  (r) = Recorded By, (t) = Taken By, (c) = Cosigned By    Initials Name Provider Type    AE Richelle Bennett, PT Physical Therapist        Clinical Impression     Row Name 12/19/19 1430          Pain Assessment    Additional Documentation  Pain Scale: Numbers Pre/Post-Treatment (Group)  -AE     Row Name 12/19/19 1430          Pain Scale: Numbers Pre/Post-Treatment    Pain Scale: Numbers, Pretreatment  2/10  -AE     Pain Scale: Numbers, Post-Treatment  2/10  -AE     Pain Location - Side  Right  -AE     Pain Location - Orientation  incisional  -AE     Pain Location  knee  -AE     Pain Intervention(s)  Medication (See MAR);Repositioned;Ambulation/increased activity;Elevated;Rest  -AE     Row Name 12/19/19 1430          Plan of Care Review    Plan of Care Reviewed With  patient;spouse  -AE     Row Name 12/19/19 1430          Physical Therapy Clinical Impression    Patient/Family Goals Statement (PT Clinical Impression)  Pt wants to DC home with HHPT to follow  -AE     Criteria for Skilled Interventions Met (PT Clinical Impression)  yes;treatment indicated  -AE     Rehab Potential (PT Clinical Summary)  good, to achieve stated therapy goals  -AE     Row Name 12/19/19 1430          Positioning and Restraints    Pre-Treatment Position  in bed  -AE     Post Treatment Position  chair  -AE     In Chair  reclined;call light within reach;encouraged to call for assist;exit alarm on  -AE       User Key  (r) = Recorded By, (t) = Taken By, (c) = Cosigned By    Initials Name Provider Type    Richelle Amador, PT Physical Therapist        Outcome Measures     Row Name 12/19/19 1432          How much help from another person do you currently need...    Turning from your back to your side while in flat bed without using bedrails?  3  -AE     Moving from lying on back to sitting on the side of a flat bed without bedrails?  4  -AE     Moving to and from a bed to a chair (including a  wheelchair)?  3  -AE     Standing up from a chair using your arms (e.g., wheelchair, bedside chair)?  4  -AE     Climbing 3-5 steps with a railing?  3  -AE     To walk in hospital room?  4  -AE     AM-PAC 6 Clicks Score (PT)  21  -AE     Row Name 12/19/19 1432          Functional Assessment    Outcome Measure Options  AM-PAC 6 Clicks Basic Mobility (PT)  -AE       User Key  (r) = Recorded By, (t) = Taken By, (c) = Cosigned By    Initials Name Provider Type    AE Richelle Bennett PT Physical Therapist        Physical Therapy Education                 Title: PT OT SLP Therapies (Done)     Topic: Physical Therapy (Done)     Point: Mobility training (Done)     Description:   Instruct learner(s) on safety and technique for assisting patient out of bed, chair or wheelchair.  Instruct in the proper use of assistive devices, such as walker, crutches, cane or brace.              Patient Friendly Description:   It's important to get you on your feet again, but we need to do so in a way that is safe for you. Falling has serious consequences, and your personal safety is the most important thing of all.        When it's time to get out of bed, one of us or a family member will sit next to you on the bed to give you support.     If your doctor or nurse tells you to use a walker, crutches, a cane, or a brace, be sure you use it every time you get out of bed, even if you think you don't need it.    Learning Progress Summary           Patient Acceptance, E,TB, VU,DU by AE at 12/19/2019 1432   Significant Other Acceptance, E,TB, VU,DU by AE at 12/19/2019 1432                   Point: Home exercise program (Done)     Description:   Instruct learner(s) on appropriate technique for monitoring, assisting and/or progressing patient with therapeutic exercises and activities.              Learning Progress Summary           Patient Acceptance, E,TB, VU,DU by AE at 12/19/2019 1432   Significant Other Acceptance, E,TB, VU,DU by AE at  12/19/2019 1432                   Point: Body mechanics (Done)     Description:   Instruct learner(s) on proper positioning and spine alignment for patient and/or caregiver during mobility tasks and/or exercises.              Learning Progress Summary           Patient Acceptance, E,TB, VU,DU by AE at 12/19/2019 1432   Significant Other Acceptance, E,TB, VU,DU by AE at 12/19/2019 1432                   Point: Precautions (Done)     Description:   Instruct learner(s) on prescribed precautions during mobility and gait tasks              Learning Progress Summary           Patient Acceptance, E,TB, VU,DU by AE at 12/19/2019 1432   Significant Other Acceptance, E,TB, VU,DU by AE at 12/19/2019 1432                               User Key     Initials Effective Dates Name Provider Type Discipline    AE 09/04/19 -  Richelle Bennett PT Physical Therapist PT              PT Recommendation and Plan  Planned Therapy Interventions (PT Eval): balance training, postural re-education, transfer training, bed mobility training, gait training, home exercise program, patient/family education, ROM (range of motion), stair training, strengthening, stretching  Outcome Summary/Treatment Plan (PT)  Anticipated Discharge Disposition (PT): home, home with assist, home with home health, home with OP services  Plan of Care Reviewed With: patient, spouse     Time Calculation:   PT Charges     Row Name 12/19/19 1435             Time Calculation    Start Time  1313  -AE      Stop Time  1342  -AE      Time Calculation (min)  29 min  -AE      PT Received On  12/19/19  -AE      PT - Next Appointment  12/20/19  -AE      PT Goal Re-Cert Due Date  12/26/19  -AE         Time Calculation- PT    Total Timed Code Minutes- PT  25 minute(s)  -AE        User Key  (r) = Recorded By, (t) = Taken By, (c) = Cosigned By    Initials Name Provider Type    AE Richelle Bennett, TAHIR Physical Therapist        Therapy Charges for Today     Code Description Service Date  Service Provider Modifiers Qty    89868075803 HC PT EVAL LOW COMPLEXITY 2 12/19/2019 Richelle Bennett, PT GP 1    95278492540 HC PT THERAPEUTIC ACT EA 15 MIN 12/19/2019 Richelle Bennett, PT GP 1    72684121898 HC GAIT TRAINING EA 15 MIN 12/19/2019 Richelle Bennett, PT GP 1          PT G-Codes  Outcome Measure Options: AM-PAC 6 Clicks Basic Mobility (PT)  AM-PAC 6 Clicks Score (PT): 21    Richelle Bennett PT  12/19/2019

## 2019-12-19 NOTE — PROGRESS NOTES
Discharge Planning Assessment  [facility]     Patient Name: Gui Zamora  MRN: [id number]  Today's Date: 12/19/2019    Admit Date: 12/19/2019    Discharge Needs Assessment     Row Name 12/19/19 1528       Living Environment    Lives With  spouse    Name(s) of Who Lives With Patient  Lexie bartlett    Current Living Arrangements  home/apartment/condo    Primary Care Provided by  self    Provides Primary Care For  no one    Family Caregiver if Needed  spouse    Quality of Family Relationships  helpful;involved;supportive    Able to Return to Prior Arrangements  yes       Resource/Environmental Concerns    Resource/Environmental Concerns  none    Transportation Concerns  car, none       Transition Planning    Patient/Family Anticipates Transition to  home with help/services    Patient/Family Anticipated Services at Transition  ;home health care    Transportation Anticipated  family or friend will provide       Discharge Needs Assessment    Readmission Within the Last 30 Days  no previous admission in last 30 days    Concerns to be Addressed  no discharge needs identified    Equipment Currently Used at Home  none    Anticipated Changes Related to Illness  none        Discharge Plan     Row Name 12/19/19 1525       Plan    Plan  home with wife and [facility]    Provided post acute provider list?  Yes    Post Acute Provider Lists  Home Health    Delivered To  Patient    Method of Delivery  In person    Patient/Family in Agreement with Plan  yes    Plan Comments  Spoke with patient and wifeZenia, at bedside.  Facesheet, PCP and pharmacy verified.  Patient lives with his wife and plans to return home at [place].  Provided with HH list and he would like [facility] .  Referral sent via [facility]. Nemo Richmond RN        Destination      Coordination has not been started for this encounter.      Durable Medical Equipment      Coordination has not been started for this encounter.      Dialysis/Infusion       Coordination has not been started for this encounter.      Home Medical Care      Service Provider Request Status Selected Services Address Phone Number Fax Number    [facility] Pending - Request Sent N/A [address] [place] [id number] [phone]      Therapy      Coordination has not been started for this encounter.      Community Resources      Coordination has not been started for this encounter.          Demographic Summary     Row Name 12/19/19 1527       General Information    Admission Type  inpatient    Arrived From  emergency department    Referral Source  admission list    Reason for Consult  discharge planning    Preferred Language  English        Functional Status     Row Name 12/19/19 1528       Functional Status    Usual Activity Tolerance  good    Current Activity Tolerance  moderate       Functional Status, IADL    Medications  independent    Meal Preparation  independent    Housekeeping  independent    Laundry  independent    Shopping  independent       Mental Status    General Appearance WDL  WDL        Psychosocial    No documentation.       Abuse/Neglect    No documentation.       Legal    No documentation.       Substance Abuse    No documentation.       Patient Forms    No documentation.           Nemo Richmond RN

## 2019-12-19 NOTE — ANESTHESIA PREPROCEDURE EVALUATION
Anesthesia Evaluation     NPO Solid Status: > 8 hours  NPO Liquid Status: > 4 hours           Airway   Mallampati: I  TM distance: >3 FB  Neck ROM: full  No difficulty expected  Dental - normal exam     Pulmonary - normal exam   Cardiovascular - normal exam        Neuro/Psych  GI/Hepatic/Renal/Endo      Musculoskeletal     Abdominal  - normal exam    Bowel sounds: normal.   Substance History      OB/GYN          Other                        Anesthesia Plan    ASA 2     general with block     intravenous induction     Anesthetic plan, all risks, benefits, and alternatives have been provided, discussed and informed consent has been obtained with: patient.

## 2019-12-19 NOTE — PLAN OF CARE
Problem: Patient Care Overview  Goal: Plan of Care Review  Outcome: Ongoing (interventions implemented as appropriate)  Note:   Pt is a 60 yo M s/p R TKA. Pt lives with spouse in quad-level home with 5 steps and L ascending rail to enter and 4 steps with L descending rail to enter main living area. Pt was independent for mobility; however owns all necessary DME for home DC. Pt presents with increased R knee pain, generalized weakness and ROM impairments. Pt was SBA for bed mobility, SBA/CGA for transfers and gait with FWW up to 100 ft. Pt was SBA/CGA to ascend/descend 4 steps with single rail. Skilled PT needed to address above impairments. Current DC recs include home with HHPT.

## 2019-12-19 NOTE — OP NOTE
Orthopaedic Operative Note    Facility: [facility]    Patient: Gui Zamora    Medical Record Number: [id number]    YOB: 1960    Dictating Surgeon: Joao Lentz M.D.*    Primary Care Physician: Fernandez Deutsch MD    Date of Operation: 12/19/2019    Pre-Operative Diagnosis:  Right knee end-stage osteoarthritis    Post-Operative Diagnosis:  Right knee end-stage osteoarthritis    Procedure Performed:   Right total knee arthroplasty    Surgeon: Joao Lentz MD     Assistant: DUY Valencia    Anesthesia: Regional followed by general.  Local administration of Exparel solution.    Complications: None.     Estimated Blood Loss: Less than 50 mL.     Implants:     1.  Smith & Nephew size 6 Legion Oxinium femoral component  2.  Smith and nephew size 6 tibial component with size 10 polyethylene liner  3.  Smith & Nephew size 38 mm patellar component    Specimens: * No orders in the log *    Brief Operative Indication:  The patient has a history of worsening knee osteoarthritis which had been refractory to prolonged conservative treatment.  The risk, benefits and alternatives to a total knee arthroplasty were discussed with the patient in detail.  He acknowledged understanding the information and consented to proceed.    Description of the procedure in detail: The patient and operative site were identified in the preoperative holding area.  The surgical site was marked.  Adequate regional anesthesia of the right lower extremity was administered.  The patient was then taken to the operating room.  Adequate general anesthesia was administered.  The patient was then repositioned on the operating table in the supine position.  A timeout was taken and preoperative antibiotics administered.    The right lower extremity was prepped and draped in the standard, sterile fashion.  I began by cleaning the extremity with an alcohol solution.  A Hibiclens scrub was performed.  The extremity  was then prepped with 2 ChloraPreps.  I allowed those to dry for 3 minutes before the draping procedure was carried out.  The leg was exsanguinated with an Esmarch bandage.  The tourniquet was inflated to 250 mmHg.  The leg was positioned at approximately 60 degrees of flexion across the knee in a DeMayo leg positioner.    I fashioned an approximately 8 cm incision anteriorly for a standard medial parapatellar approach.  Full-thickness medial and lateral skin flaps were developed.  The extensor mechanism was carefully exposed.  I performed a medial parapatellar arthrotomy, careful to maintain a cuff of tendinous tissue for later anatomic repair.  The joint was entered.  The infrapatellar fat pad was carefully removed.    Next, the anteromedial soft tissues were carefully elevated off of the anterior face of the tibia.  The MCL was kept protected at all times.  At this point, the joint was inspected.  There was marked arthrosis throughout the joint.  The periarticular osteophytes were carefully removed with a rongeur.    An opening was created in the distal femur.  The wound was irrigated out and then the distal femur alignment renée was inserted down the canal.  A 5 degree valgus distal femoral cutting guide was pinned into position and then the distal femoral cut carried out in the typical fashion.  I inspected and measured to make sure the cut was appropriate.  The cut portion of bone was removed followed by the guide.    Next, the knee was flexed up further to allow for insertion of the the posterior referencing guide.  I measured the distal femur.  I determined the appropriate size as referenced off of the posterior condyles.  The femur measured a  6.  The guide was positioned, taking care to align this properly and then the anterior, posterior and chamfer cuts were carried out.  The cut portions of bone were then removed.      I then directed my attention to the tibia.  Retractors were positioned to keep the  collateral ligaments, PCL and posterior neurovascular structures protected.  The extra medullary guide was positioned.  I took care to align the renée with the anterior face of the tibia.  I made sure that this was parallel and that the guide was centered at the knee and ankle.  I measured and carefully positioned the guide to allow for correction of the preoperative deformity.  I pinned the guide and then checked the alignment one more time with an annita wing.  Once we had the guide in good position and secure, an oscillating saw was used to carry out the proximal tibia cut.  The cut portion of bone was removed and the PCL inspected.  The PCL was intact and I determined to place a CR implant.  The menisci were removed and the posterior capsule was infiltrated with some of the Exparel solution.    Next, I measured the proximal tibia cut.  This measured a 6.  The trial implant was pinned into position, taking care to maintain appropriate rotation.  The proximal tibial preparations were then completed.  I then trialed with a size 6 femur and size 6 tibia.  The knee seemed to be well balanced and demonstrated excellent motion with a size 10 trial polyethylene liner.    I then examined the patella.  The patella demonstrated extensive arthrosis.  I determined that resurfacing was indicated.  The patellar preparations were carried out at this time and then I trialed with a size 35 patella but determined to upsize to the 38.  With this implant in place, the patella tracked well.  A lateral release was deemed unnecessary in this case.    The final preparations were then completed.  The distal femur was prepared and then the trial implants were removed.  The appropriate size implants were opened at this point.  My assistant mixed the bone cement on the back table using current generation cement mixing technique and a centrifuge.  Once the cement was prepared, cement was applied to the bony surfaces and implants.  The implants  were carefully impacted into position.  I made sure that these were fully seated.  The excess, extruded cement was carefully removed with a [place] elevator.  The knee was taken out into full extension and the trial polyethylene liner inserted.  The patella was then clamped into position.  Again, the excess, extruded cement was removed.  The knee was left in extension with the patella clamped until the cement had fully cured.    While the cement was curing, the periarticular soft tissue structures were carefully infiltrated with the Exparel solution.  Once the cement had fully cured, I again checked the balancing of the knee.  Again, the knee demonstrated excellent motion and stability with the 10 trial liner.  The trial was removed.  The final implant was impacted into position.  I took care to make sure that the dovetails were fully interdigitated.  Again the knee was carried through range of motion.  The patella tracked well and the knee demonstrated excellent motion and stability.    The wound was irrigated with 500 cc of a Betadine containing saline solution.  This was left in place for 3 minutes.  I then irrigated with 3 L of sterile saline mixed with bacitracin via pulsatile lavage.  The tourniquet was deflated.  A gram of trans-examic acid was administered.  I made sure that we had good hemostasis.  A 10 Czech Hemovac drain was placed.  The parapatellar arthrotomy was anatomically repaired using a PDS strata fix suture and multiple #1 Vicryl sutures.  The subcutaneous tissues were repaired using 2-0 Vicryl.  A running strata fix Monocryl suture was used to close the skin followed by Dermabond.  Sterile dressings were applied.  The drapes were withdrawn.  The patient was awakened and taken to the recovery room in good condition.    Joao Lentz MD  12/19/19

## 2019-12-20 ENCOUNTER — TELEPHONE (OUTPATIENT)
Dept: ORTHOPEDIC SURGERY | Facility: CLINIC | Age: 59
End: 2019-12-20

## 2019-12-20 VITALS
DIASTOLIC BLOOD PRESSURE: 77 MMHG | HEIGHT: 74 IN | TEMPERATURE: 99.1 F | WEIGHT: 229.06 LBS | RESPIRATION RATE: 16 BRPM | HEART RATE: 67 BPM | OXYGEN SATURATION: 97 % | SYSTOLIC BLOOD PRESSURE: 130 MMHG | BODY MASS INDEX: 29.4 KG/M2

## 2019-12-20 LAB
HCT VFR BLD AUTO: 37.7 % (ref 37.5–51)
HGB BLD-MCNC: 13 G/DL (ref 13–17.7)

## 2019-12-20 PROCEDURE — 99024 POSTOP FOLLOW-UP VISIT: CPT | Performed by: NURSE PRACTITIONER

## 2019-12-20 PROCEDURE — 97110 THERAPEUTIC EXERCISES: CPT

## 2019-12-20 PROCEDURE — 85014 HEMATOCRIT: CPT | Performed by: ORTHOPAEDIC SURGERY

## 2019-12-20 PROCEDURE — 85018 HEMOGLOBIN: CPT | Performed by: ORTHOPAEDIC SURGERY

## 2019-12-20 PROCEDURE — 97150 GROUP THERAPEUTIC PROCEDURES: CPT

## 2019-12-20 RX ORDER — ONDANSETRON 4 MG/1
4 TABLET, FILM COATED ORAL EVERY 6 HOURS PRN
Qty: 12 TABLET | Refills: 0 | Status: SHIPPED | OUTPATIENT
Start: 2019-12-20 | End: 2019-12-20

## 2019-12-20 RX ORDER — HYDROCODONE BITARTRATE AND ACETAMINOPHEN 7.5; 325 MG/1; MG/1
TABLET ORAL
Qty: 42 TABLET | Refills: 0 | Status: SHIPPED | OUTPATIENT
Start: 2019-12-20 | End: 2019-12-23 | Stop reason: SDUPTHER

## 2019-12-20 RX ORDER — ONDANSETRON 4 MG/1
4 TABLET, FILM COATED ORAL EVERY 6 HOURS PRN
Qty: 12 TABLET | Refills: 0 | Status: SHIPPED | OUTPATIENT
Start: 2019-12-20 | End: 2022-09-09

## 2019-12-20 RX ORDER — HYDROCODONE BITARTRATE AND ACETAMINOPHEN 7.5; 325 MG/1; MG/1
TABLET ORAL
Qty: 42 TABLET | Refills: 0 | Status: SHIPPED | OUTPATIENT
Start: 2019-12-20 | End: 2019-12-20

## 2019-12-20 RX ORDER — MELOXICAM 15 MG/1
15 TABLET ORAL DAILY PRN
Start: 2019-12-20 | End: 2020-01-16 | Stop reason: SDUPTHER

## 2019-12-20 RX ORDER — ASPIRIN 81 MG/1
81 TABLET ORAL 2 TIMES DAILY
Start: 2019-12-20 | End: 2022-09-09

## 2019-12-20 RX ORDER — PSEUDOEPHEDRINE HCL 30 MG
100 TABLET ORAL 2 TIMES DAILY
Qty: 60 EACH | Refills: 0
Start: 2019-12-20 | End: 2022-09-09

## 2019-12-20 RX ADMIN — POLYETHYLENE GLYCOL 3350 17 G: 17 POWDER, FOR SOLUTION ORAL at 08:58

## 2019-12-20 RX ADMIN — HYDROCODONE BITARTRATE AND ACETAMINOPHEN 1 TABLET: 7.5; 325 TABLET ORAL at 05:24

## 2019-12-20 RX ADMIN — DOCUSATE SODIUM 100 MG: 100 CAPSULE, LIQUID FILLED ORAL at 08:58

## 2019-12-20 RX ADMIN — HYDROCODONE BITARTRATE AND ACETAMINOPHEN 2 TABLET: 7.5; 325 TABLET ORAL at 08:58

## 2019-12-20 RX ADMIN — HYDROCODONE BITARTRATE AND ACETAMINOPHEN 1 TABLET: 7.5; 325 TABLET ORAL at 01:03

## 2019-12-20 RX ADMIN — MELOXICAM 15 MG: 15 TABLET ORAL at 08:58

## 2019-12-20 RX ADMIN — MUPIROCIN 1 APPLICATION: 20 OINTMENT TOPICAL at 08:58

## 2019-12-20 RX ADMIN — ASPIRIN 81 MG: 81 TABLET, COATED ORAL at 08:58

## 2019-12-20 RX ADMIN — SODIUM CHLORIDE, PRESERVATIVE FREE 3 ML: 5 INJECTION INTRAVENOUS at 09:05

## 2019-12-20 NOTE — THERAPY DISCHARGE NOTE
Patient Name: Gui Zamora  : 1960    MRN: [id number]                              Today's Date: 2019       Admit Date: 2019    Visit Dx:     ICD-10-CM ICD-9-CM   1. S/P total knee replacement, right Z96.651 V43.65   2. Primary osteoarthritis of right knee M17.11 715.16     Patient Active Problem List   Diagnosis   • Primary osteoarthritis of right knee   • Plantar fasciitis   • Impingement syndrome of left ankle   • Pes planus     Past Medical History:   Diagnosis Date   • Arthritis      Past Surgical History:   Procedure Laterality Date   • COLONOSCOPY     • HERNIA REPAIR     • TOTAL KNEE ARTHROPLASTY Right 2019    Procedure: TOTAL KNEE ARTHROPLASTY;  Surgeon: Joao Lentz MD;  Location: [facility];  Service: Orthopedics   • UMBILICAL HERNIA REPAIR       General Information     Row Name 19 1121          PT Evaluation Time/Intention    Document Type  therapy note (daily note);discharge treatment  -MS     Mode of Treatment  physical therapy;group therapy  -MS       User Key  (r) = Recorded By, (t) = Taken By, (c) = Cosigned By    Initials Name Provider Type    MS Nilo Adam, PT Physical Therapist        Mobility     Row Name 19 1121          Bed Mobility Assessment/Treatment    Comment (Bed Mobility)  Up in chair  -MS     Row Name 19 1121          Sit-Stand Transfer    Sit-Stand [place] (Transfers)  independent  -MS     Assistive Device (Sit-Stand Transfers)  walker, front-wheeled  -MS     Row Name 19 1121          Gait/Stairs Assessment/Training    [place] Level (Gait)  independent  -MS     Assistive Device (Gait)  walker, front-wheeled  -MS     Distance in Feet (Gait)  120 feet  -MS     Pattern (Gait)  step-through  -MS     Deviations/Abnormal Patterns (Gait)  antalgic  -MS     [place] Level (Stairs)  stand by assist  -MS     Handrail Location (Stairs)  right side (ascending)  -MS     Number of Steps (Stairs)  4  -MS      Ascending Technique (Stairs)  step-to-step  -MS     Descending Technique (Stairs)  step-to-step  -MS       User Key  (r) = Recorded By, (t) = Taken By, (c) = Cosigned By    Initials Name Provider Type    Nilo Da Silva, PT Physical Therapist        Obj/Interventions     Row Name 12/20/19 1122          General ROM    GENERAL ROM COMMENTS  Right knee AROM (6, 90)  -MS     Row Name 12/20/19 1122          Therapeutic Exercise    Comment (Therapeutic Exercise)  Right TKR protocol x 15 reps completed  -MS       User Key  (r) = Recorded By, (t) = Taken By, (c) = Cosigned By    Initials Name Provider Type    Nilo Da Silva, PT Physical Therapist        Goals/Plan    No documentation.       Clinical Impression     Row Name 12/20/19 1122          Pain Scale: Numbers Pre/Post-Treatment    Pain Scale: Numbers, Pretreatment  3/10  -MS     Pain Scale: Numbers, Post-Treatment  3/10  -MS     Pain Location - Side  Right  -MS     Pain Location  knee  -MS     Row Name 12/20/19 1122          Positioning and Restraints    Pre-Treatment Position  sitting in chair/recliner  -MS     Post Treatment Position  chair  -MS     In Chair  notified nsg;reclined;sitting;call light within reach;encouraged to call for assist;exit alarm on  -MS       User Key  (r) = Recorded By, (t) = Taken By, (c) = Cosigned By    Initials Name Provider Type    Nilo Da Silva, PT Physical Therapist        Outcome Measures     Row Name 12/20/19 1123          How much help from another person do you currently need...    Turning from your back to your side while in flat bed without using bedrails?  4  -MS     Moving from lying on back to sitting on the side of a flat bed without bedrails?  4  -MS     Moving to and from a bed to a chair (including a wheelchair)?  4  -MS     Standing up from a chair using your arms (e.g., wheelchair, bedside chair)?  4  -MS     Climbing 3-5 steps with a railing?  3  -MS     To walk in hospital room?  4  -MS     AM-PAC 6  Clicks Score (PT)  23  -MS     Row Name 12/20/19 1123          Functional Assessment    Outcome Measure Options  AM-PAC 6 Clicks Basic Mobility (PT)  -MS       User Key  (r) = Recorded By, (t) = Taken By, (c) = Cosigned By    Initials Name Provider Type    Nilo Da Silva MEENA, PT Physical Therapist        Physical Therapy Education                 Title: PT OT SLP Therapies (Done)     Topic: Physical Therapy (Done)     Point: Mobility training (Done)     Description:   Instruct learner(s) on safety and technique for assisting patient out of bed, chair or wheelchair.  Instruct in the proper use of assistive devices, such as walker, crutches, cane or brace.              Patient Friendly Description:   It's important to get you on your feet again, but we need to do so in a way that is safe for you. Falling has serious consequences, and your personal safety is the most important thing of all.        When it's time to get out of bed, one of us or a family member will sit next to you on the bed to give you support.     If your doctor or nurse tells you to use a walker, crutches, a cane, or a brace, be sure you use it every time you get out of bed, even if you think you don't need it.    Learning Progress Summary           Patient Acceptance, E,D, VU,DU by MS at 12/20/2019 1123    Acceptance, E,TB, VU,DU by AE at 12/19/2019 1432   Significant Other Acceptance, E,TB, VU,DU by AE at 12/19/2019 1432                   Point: Home exercise program (Done)     Description:   Instruct learner(s) on appropriate technique for monitoring, assisting and/or progressing patient with therapeutic exercises and activities.              Learning Progress Summary           Patient Acceptance, E,D, VU,DU by MS at 12/20/2019 1123    Acceptance, E,TB, VU,DU by AE at 12/19/2019 1432   Significant Other Acceptance, E,TB, VU,DU by AE at 12/19/2019 1432                   Point: Body mechanics (Done)     Description:   Instruct learner(s) on proper  positioning and spine alignment for patient and/or caregiver during mobility tasks and/or exercises.              Learning Progress Summary           Patient Acceptance, E,D, VU,DU by MS at 12/20/2019 1123    Acceptance, E,TB, VU,DU by AE at 12/19/2019 1432   Significant Other Acceptance, E,TB, VU,DU by AE at 12/19/2019 1432                   Point: Precautions (Done)     Description:   Instruct learner(s) on prescribed precautions during mobility and gait tasks              Learning Progress Summary           Patient Acceptance, E,D, VU,DU by MS at 12/20/2019 1123    Acceptance, E,TB, VU,DU by AE at 12/19/2019 1432   Significant Other Acceptance, E,TB, VU,DU by AE at 12/19/2019 1432                               User Key     Initials Effective Dates Name Provider Type Discipline    MS 04/03/18 -  Nilo Adam, PT Physical Therapist PT    AE 09/04/19 -  Richelle Bennett PT Physical Therapist PT              PT Recommendation and Plan     Plan of Care Reviewed With: patient  Progress: improving  Outcome Summary: Improved tolerance to functional activity this AM with an increase in gait distance, progression of ther. ex. program, and completion of stair training.  Pt. to discharge home with HHPT this date.     Time Calculation:   PT Charges     Row Name 12/20/19 1124             Time Calculation    Start Time  0935  -MS      Stop Time  1015  -MS      Time Calculation (min)  40 min  -MS      PT Received On  12/20/19  -MS         Time Calculation- PT    Total Timed Code Minutes- PT  20 minute(s)  -MS        User Key  (r) = Recorded By, (t) = Taken By, (c) = Cosigned By    Initials Name Provider Type    MS Nilo Adam, PT Physical Therapist        Therapy Charges for Today     Code Description Service Date Service Provider Modifiers Qty    00687861349 HC PT THER PROC EA 15 MIN 12/20/2019 Nilo Adam, PT GP 1    09910840425 HC PT THER PROC GROUP 12/20/2019 Nilo Adam, PT GP 1          PT  G-Codes  Outcome Measure Options: AM-PAC 6 Clicks Basic Mobility (PT)  AM-PAC 6 Clicks Score (PT): 23    PT Discharge Summary  Reason for Discharge: Discharge from facility  Outcomes Achieved: Refer to plan of care for updates on goals achieved  Discharge Destination: Home with home health, Home with assist    Nilo Adam, PT  12/20/2019

## 2019-12-20 NOTE — PLAN OF CARE
Problem: Patient Care Overview  Goal: Plan of Care Review  Outcome: Ongoing (interventions implemented as appropriate)  Flowsheets (Taken 12/20/2019 0607)  Progress: improving  Plan of Care Reviewed With: patient  Outcome Summary: pt ambulating well with wx and assist. Pain well controlled. no comorbidities to address at this time.     Problem: Fall Risk (Adult)  Goal: Identify Related Risk Factors and Signs and Symptoms  Outcome: Ongoing (interventions implemented as appropriate)

## 2019-12-20 NOTE — PROGRESS NOTES
Case Management Discharge Note      Final [facility] [place]    Provided post acute provider list?: Yes  Post Acute Provider Lists: Home Health  Delivered To: Patient  Method of Delivery: In person    Destination      No service has been selected for the patient.      Durable Medical Equipment      No service has been selected for the patient.      Dialysis/Infusion      No service has been selected for the patient.      Home Medical Care - Selection Complete      Service Provider Request Status Selected Services Address Phone Number Fax Number    [facility] Selected Home Health Services [address] [address] [phone]      Therapy      No service has been selected for the patient.      Community Resources      No service has been selected for the patient.        Transportation Services  Private: Car    Final Discharge Disposition Code: 06 - home with home health care

## 2019-12-20 NOTE — DISCHARGE SUMMARY
Date of Admission:  12/19/2019    Date of Discharge:  12/20/2019    Discharge Diagnosis: s/p Right total knee arthroplasty    Admitting Physician: Joao Lentz    Consults: none    DETAILS OF HOSPITAL STAY:  Patient is a 59 y.o. male was admitted to the floor following a total knee arthroplasty.  Post-operatively the patient was transferred to the post-operative floor where the patient underwent physical therapy that included active as well as passive ROM exercises. Opioids were titrated to achieve appropriate pain management to allow for participation in mobilization exercises. Vital signs are now stable. The incision is benign without signs or symptoms of infection. Operative extremity neurovascular status remains intact. Appropriate education re: incision care, activity levels, medications, and follow up visits was completed and all questions were answered. The patient is now deemed stable for discharge to home.    Condition on Discharge:  Stable    Discharge Medications     Discharge Medications      New Medications      Instructions Start Date   aspirin 81 MG EC tablet   81 mg, Oral, 2 Times Daily      docusate sodium 100 MG capsule   100 mg, Oral, 2 Times Daily      HYDROcodone-acetaminophen 7.5-325 MG per tablet  Commonly known as:  NORCO   Take 1 to 2 tablets by mouth every 4 (four) to 6 (six) hours as needed for pain.  Not to exceed 6 tabs per day.      ondansetron 4 MG tablet  Commonly known as:  ZOFRAN   4 mg, Oral, Every 6 Hours PRN         Changes to Medications      Instructions Start Date   meloxicam 15 MG tablet  Commonly known as:  MOBIC  What changed:  additional instructions   15 mg, Oral, Daily PRN         Stop These Medications    HIBICLENS EX     mupirocin 2 % ointment  Commonly known as:  BACTROBAN          Discharge Diet: regular    Activity at Discharge: as tolerated    Discharge Instructions:   1)  Patient is to continue with physical therapy exercises daily and continue working with the  physical therapist as ordered.  2)  Continue to follow precautions as instructed.   3)  Patient may weight bear as tolerated.   4)  Continue to ice regularly. Patient instructed on frequent calf pumping exercises.  Patient also instructed on incentive spirometer during hospitalization and encouraged to continue to use at home regularly.   5)  Patient is instructed to continue DVT prophylaxis.  6)  The dressing should be left in place. If waterproof dressing is intact the patient may shower immediately following discharge. If the dressing becomes disloged or saturated it should be changed and patient must wait until POD #5 to shower. If dressing is changed, apply dry sterile dressing after showering.  7)  Follow up appointment in 2 weeks - patient to call the office at [phone] to schedule.     Follow-up Appointments  2 weeks with Dr Mary Carmen Allred, FREDIS  12/20/19  5:42 PM

## 2019-12-20 NOTE — PLAN OF CARE
Problem: Patient Care Overview  Goal: Plan of Care Review  Flowsheets (Taken 12/20/2019 1123)  Progress: improving  Plan of Care Reviewed With: patient  Outcome Summary: Improved tolerance to functional activity this AM with an increase in gait distance, progression of ther. ex. program, and completion of stair training.  Pt. to discharge home with HHPT this date.

## 2019-12-20 NOTE — TELEPHONE ENCOUNTER
Called patient post op and he is doing ok. He is scheduled for a f/u appt on 1/3/20 and was advised to call with any questions or problems.

## 2019-12-23 DIAGNOSIS — Z96.651 S/P TOTAL KNEE REPLACEMENT, RIGHT: ICD-10-CM

## 2019-12-23 RX ORDER — HYDROCODONE BITARTRATE AND ACETAMINOPHEN 7.5; 325 MG/1; MG/1
TABLET ORAL
Qty: 42 TABLET | Refills: 0 | Status: SHIPPED | OUTPATIENT
Start: 2019-12-23 | End: 2019-12-31 | Stop reason: SDUPTHER

## 2019-12-24 ENCOUNTER — TELEPHONE (OUTPATIENT)
Dept: ORTHOPEDIC SURGERY | Facility: CLINIC | Age: 59
End: 2019-12-24

## 2019-12-24 NOTE — TELEPHONE ENCOUNTER
Called patients pharmacy and they will not allow a refill until 12/26. Patient was notified of this and stated that he can stretch out the pain pills he has left to make it last until then.

## 2019-12-24 NOTE — TELEPHONE ENCOUNTER
"We sent script yesterday for his pain meds and patient says pharmacy can't fill yet \"due to the law\". He is post op and will not have enough rx thru [facility]. Says pharmacy told him doctor would have to call.  "

## 2019-12-26 NOTE — DISCHARGE PLACEMENT REQUEST
"Gui Zamora (59 y.o. Male)     Date of Birth Social Security Number Address Home Phone MRN    1960  [address] [facility] [address] [phone] [id number]    Church Marital Status          None        Admission Date Admission Type Admitting Provider Attending Provider Department, Room/Bed    12/19/19 Elective Joao Lentz MD McClure, Scott B, MD [address], P897/1    Discharge Date Discharge Disposition Discharge Destination                       Attending Provider:  Joao Lentz MD    Allergies:  No Known Allergies    Isolation:  None   Infection:  None   Code Status:  CPR    Ht:  186.7 cm (73.5\")   Wt:  104 kg (229 lb 0.9 oz)    Admission Cmt:  None   Principal Problem:  Primary osteoarthritis of right knee [M17.11]                 Active Insurance as of 12/19/2019     Primary Coverage     Payor Plan Insurance Group Employer/Plan Group    ANTH BLUE CROSS ANTH BLUE CROSS BLUE SHIELD PPO [id number]     Payor Plan Address Payor Plan Phone Number Payor Plan Fax Number Effective Dates    [address] [phone]  10/1/2019 - None Entered    [facility] [id number]       Subscriber Name Subscriber Birth Date Member ID       GUI ZAMORA 1960 [id number]                 Emergency Contacts      (Rel.) Home Phone Work Phone Mobile Phone    SeraZenia (Spouse) -- -- [phone]              " Discharge Summary - Cardiothoracic Surgery   [facility] 61 y o  male MRN: [id number]  Unit/Bed#: [facility] 422-01 Encounter: [id number]    Admission Date: 12/13/2019     Discharge Date: 12/26/19    Admitting Diagnosis: Chest pain [R07 9]    Primary Discharge Diagnosis:   Coronary artery disease  S/P coronary artery bypass grafting;    Secondary Discharge Diagnosis:   CAD (s/p previous TAMARA mRCA & pLCx 2017), HTN, HLD, current tobacco abuse (PTA), sinus bradycardia    Attending: LISA Brito  Consulting Physician(s):   Cardiology  Medical/Critical Care    Procedures Performed:   Procedure(s):  CORONARY ARTERY BYPASS GRAFT (CABG) 3 VESSELS: LIMA to LAD, EVH/SVG to PDA and OM  HARVEST VEIN ENDOSCOPIC (EVH)  TRANSESOPHAGEAL ECHOCARDIOGRAM (PHILIP)     Hospital Course:   12/13: Presented to [facility] c/o burning chest pressure with radiation to left arm and neck that woke him from sleep  H/O CAD s/p TAMARA x 2  Cardiac cath shows MVCAD  To be transferred to [facility] for cardiac surgical evaluation  12/14: Arrived to [facility]  Surgery consultation completed  Pre op work up initiated  12/15: No angina/dyspnea  No events overnight  Vascular studies pending  CT chest identified new lung nodule  Follow up CT head, abdomen, and pelvis ordered to rule out metastatic disease  12/16: Episode of CP @ rest last night, persistent this morning, 3/10  BB discontinued due to bradycardia  Currently sinus richie, rate 52  Echo ordered  Awaiting PFT, CT head, abd/pelvis scheduled for this morning  Cardiology consult placed for B  12/17: CT abd/pelvis & head complete w/o acute findings  For PFTs & TTE today  SQ Lovenox changed to SQ heparin  T&S ordered for tomorrow AM   12/18: TTE & PFTs complete  Pre-op orders placed  Consent signed & on chart  Ready for OR tomorrow  12/19: CABG x3  Transferred on no IV support  In NSR  No noted postoperative bleeding  Episode of hypoxia while intubated, with PO2 70s   PEEP increased to 12 and FIO2 increased to 100%  Stat PHILIP shows RA enlargement, given Lasix 20mg IV x 1 with adequate response  On vent wean, FIO2 decreased 60%, PEEP 10  Required Evan intermittently, now off   12/20: Bradycardia overnight  EPW not capturing  Milrinone @ 0 25 started for low CI and low SVO2 with improvement  Low UOP/high CVP, given  lasix x 3 doses  Trouble weaning vent due to agitation, extubated this AM to HFNC  Plan to wean milrinone  Hold BB for now  Continue HFNC  Keep SG and arterial line  Keep ICU  Lasix TID standing  Keep CT/EPW  TTE to look at heart function  12/21: Reintubated last night for persistent hypoxia  Bronch removed mucous plug from left lung  Repeat bronch today then vent wean  Milrinone off, bedside echo shows EF 70%  Discontinue swan  Evan on at 79  Bradycardic, HR 50s, hold beta blocker  Discontinue pacing wires  Keep CT and felipe  12/22: Weaning from vent  Bronch today at 11am, then wean to extubate  No abx at this time, leukocytosis improving, WBC 14 (19), Tmax 100 6F  Discontinue chest tubes  CXR shows improved aeration of right lung  Tight pain control once extubated  12/23: Bronchoscopy yesterday, BiPap overnight and HFNC this morning, in NSR, -1L/24h, MAP 86  D/C keila  Lopressor 12 5 mg bid,  wean HFNC, Lasix bid  Transfer to stepdown 1, but maintain in ICU  Remains on Cardene 2mg, Metoprolol increased to 25mg  Requiring Bipap PRN for desaturations with activity  12/24: Maintained on HFNC throughout the day and FiO2 weaned down  No overnight events  Reports productive cough and feels much better than yesterday  Lisinopril added for hypertension  Transferred from ICU to step down  PM: No events  Transitioned to [facility]  (+) BM   12/25: Weaned to RA  Pain better controlled  D/C IV dilaudid, oxycodone q6 or q8 now with ATC Tylenol  D/C TLC  Home tomorrow  12/26: Pain controlled  Ambulating independently  Tolerating diet  Stable for discharge to home    Diuretics x 5 days for home     Condition at Discharge:   good     Discharge Physical Exam:    HEENT/NECK:  PERRLA  No jugular venous distention  Cardiac: Regular rate and rhythm  Pulmonary:  Breath sounds clear bilaterally  Abdomen:  Non-tender, Non-distended and Normal bowel sounds  Incisions: Sternum is stable  Incision is clean, dry, and intact  and Saphenectomy incison is clean, dry, and intact  Extremities: Extremities warm/dry and Trace edema B/L  Neuro: Alert and oriented X 3  Skin: Warm/Dry, without rashes or lesions  Discharge Data:  Results from last 7 days   Lab Units 12/25/19  0526 12/24/19  0433 12/23/19  0407   WBC Thousand/uL 10 44* 9 29 12 56*   HEMOGLOBIN g/dL 11 0* 10 0* 10 5*   HEMATOCRIT % 35 0* 31 9* 31 8*   PLATELETS Thousands/uL 281 203 172     Results from last 7 days   Lab Units 12/26/19  0622 12/25/19  0526 12/24/19  0433  12/20/19  2357   POTASSIUM mmol/L 3 5 3 5 3 8   < >  --    CHLORIDE mmol/L 107 108 110*   < >  --    CO2 mmol/L 32 29 31   < >  --    CO2, I-STAT mmol/L  --   --   --   --  25   BUN mg/dL 38* 42* 45*   < >  --    CREATININE mg/dL 1 22 1 15 1 32*   < >  --    GLUCOSE, ISTAT mg/dl  --   --   --   --  134   CALCIUM mg/dL 9 1 9 3 8 9   < >  --     < > = values in this interval not displayed  Discharge instructions/Information to patient and family:   See after visit summary for information provided to patient and family  Ayana Law was educated on restrictions regarding driving and lifting, and techniques of proper incisional care  They were specifically counselled on signs and symptoms of an incisional infection, and advised to contact our service immediately should they develop fevers, sweats, chill, redness or drainage at the site of any incisions  Provisions for Follow-Up Care:  See after visit summary for information related to follow-up care and any pertinent home health orders        Disposition:  Home    Planned Readmission:   No    Discharge Medications:  See after visit summary for reconciled discharge medications provided to patient and family  Karen Rae was provided contact information and scheduled a follow up appointment with LISA Camacho  Additionally, follow up appointments have been scheduled for their primary care physician and primary cardiologist   Contact information was provided  Upon admission, troponins were elevated at 0 05  NSTEMI was identified and documented  Karen Rae was counseled on the importance of avoiding tobacco products  As with all patients whom have undergone open heart surgery, tobacco cessation medication was contraindicated at the time of discharge  ACE/ARB was Contraindicated secondary to hypotension      The patient was discharged on ongoing diuretic therapy with Torsemide 20 mg, PO QD and Potassium Chloride 20 mEq, PO QD  They were advised to continue these medications for 5 days, unless otherwise directed  Narcotic pain medication was prescribed in the form of percocet  Prior to prescribing, their prescription profile was reviewed on the [facility] of health prescription drug monitoring program     The patient was informed that following their postoperative surgical evaluation, they will be referred to outpatient cardiac rehabilitation  They were counseled that this program is run by specialists who will help them safely strengthen their heart and prevent more heart disease  Cardiac rehabilitation will include exercise, relaxation, stress management, and heart-healthy nutrition  Caregivers will also check to make sure their medication regimen is working  I spent 30 minutes discharging the patient  This time was spent on the day of discharge  I had direct contact with the patient on the day of discharge  Additional documentation is required if more than 30 minutes were spent on discharge       SIGNATURE: Sonya Gaffney  DATE: December 26, 2019  TIME: 10:09 AM

## 2019-12-30 ENCOUNTER — TELEPHONE (OUTPATIENT)
Dept: ORTHOPEDIC SURGERY | Facility: CLINIC | Age: 59
End: 2019-12-30

## 2019-12-30 NOTE — TELEPHONE ENCOUNTER
PATIENT CALLED AND HE STATED HE IS HAVING A LOT OF PAIN STILL. HE SAID HE IS NOT GETTING A RELIEF. SX WAS 12/19/19. IF HE DOUBLES UP ON PAIN MEDS THEN HE RUNS OUT. NO FEVER, RED OR CHILLS. CALF MAYBE HURTS A LITTLE HE STATED. HE SAID HE ELEVATED AND ICED A LOT YESTERDAY. HE WOULD LIKE SOMEONE TO CALL HIM

## 2019-12-30 NOTE — TELEPHONE ENCOUNTER
I spoke to Mr. Zamora.  Reports he has had significant pain and swelling about the knee, but feels some better today.  When I asked about the calf pain, he said his calf feels okay, but he continues to has some thigh pain.  I recommended he continue to ice and elevate his leg above his heart.  He will keep the appointment with Dr. Lentz as scheduled on January 3.  I encouraged him to call us if he needs to be seen sooner.  He acknowledged understanding and appreciated the return call

## 2019-12-31 DIAGNOSIS — Z96.651 S/P TOTAL KNEE REPLACEMENT, RIGHT: ICD-10-CM

## 2019-12-31 RX ORDER — HYDROCODONE BITARTRATE AND ACETAMINOPHEN 7.5; 325 MG/1; MG/1
TABLET ORAL
Qty: 42 TABLET | Refills: 0 | Status: SHIPPED | OUTPATIENT
Start: 2019-12-31 | End: 2022-09-09

## 2020-01-03 ENCOUNTER — OFFICE VISIT (OUTPATIENT)
Dept: ORTHOPEDIC SURGERY | Facility: CLINIC | Age: 60
End: 2020-01-03

## 2020-01-03 VITALS — TEMPERATURE: 98.3 F | BODY MASS INDEX: 29.39 KG/M2 | HEIGHT: 74 IN | WEIGHT: 229 LBS

## 2020-01-03 DIAGNOSIS — Z09 SURGERY FOLLOW-UP: ICD-10-CM

## 2020-01-03 DIAGNOSIS — Z96.651 STATUS POST TOTAL RIGHT KNEE REPLACEMENT: Primary | ICD-10-CM

## 2020-01-03 PROCEDURE — 99024 POSTOP FOLLOW-UP VISIT: CPT | Performed by: ORTHOPAEDIC SURGERY

## 2020-01-03 PROCEDURE — 73560 X-RAY EXAM OF KNEE 1 OR 2: CPT | Performed by: ORTHOPAEDIC SURGERY

## 2020-01-03 RX ORDER — HYDROCODONE BITARTRATE AND ACETAMINOPHEN 7.5; 325 MG/1; MG/1
1 TABLET ORAL EVERY 4 HOURS PRN
Qty: 50 TABLET | Refills: 0 | Status: SHIPPED | OUTPATIENT
Start: 2020-01-03 | End: 2022-09-09

## 2020-01-04 NOTE — PROGRESS NOTES
Gui Zamora     : 1960     MRN: [id number]     DATE: 2020    DIAGNOSIS:  2 week follow up right TKA    SUBJECTIVE:  Patient returns today for 2 week follow up of right total knee replacement. Patient reports doing well with no unusual complaints.     OBJECTIVE:     Exam: The incision is healing appropriately.  No sign of infection.  Range of motion is okay but not great.  He lacks a few degrees of terminal extension.  Flexion is to about 90 degrees.  The calf is soft and nontender with a negative Homans sign.    DIAGNOSTIC STUDIES     Xrays: 2 views of the right knee (AP and lateral) were ordered and reviewed for evaluation of recent knee replacement. They demonstrate a well positioned, well aligned knee replacement without complicating factors noted. In comparison with previous films there has been interval implant placement.    ASSESSMENT: 2 week status post right knee replacement.    PLAN:   1) Order given for PT   2) Discontinue BETH hose   3) Continue ice PRN   4) Continue DVT prophylaxis.   5) Follow up in 4 weeks with repeat 3 view x-rays    Joao Lentz MD  2020

## 2020-01-16 DIAGNOSIS — Z96.651 STATUS POST TOTAL RIGHT KNEE REPLACEMENT: Primary | ICD-10-CM

## 2020-01-16 RX ORDER — MELOXICAM 15 MG/1
15 TABLET ORAL DAILY PRN
Qty: 30 TABLET | Refills: 0 | Status: SHIPPED | OUTPATIENT
Start: 2020-01-16 | End: 2020-03-11 | Stop reason: SDUPTHER

## 2020-01-31 ENCOUNTER — OFFICE VISIT (OUTPATIENT)
Dept: ORTHOPEDIC SURGERY | Facility: CLINIC | Age: 60
End: 2020-01-31

## 2020-01-31 VITALS — BODY MASS INDEX: 29.39 KG/M2 | TEMPERATURE: 97.5 F | WEIGHT: 229 LBS | HEIGHT: 74 IN

## 2020-01-31 DIAGNOSIS — Z96.651 STATUS POST TOTAL RIGHT KNEE REPLACEMENT: Primary | ICD-10-CM

## 2020-01-31 PROCEDURE — 73562 X-RAY EXAM OF KNEE 3: CPT | Performed by: NURSE PRACTITIONER

## 2020-01-31 PROCEDURE — 99024 POSTOP FOLLOW-UP VISIT: CPT | Performed by: NURSE PRACTITIONER

## 2020-01-31 NOTE — PROGRESS NOTES
Gui Zamora : 1960 MRN: [id number] DATE: 2020    DIAGNOSIS: 6 week follow up right TKA      SUBJECTIVE:  Patient returns today for 6 week follow up of right total knee replacement.  Patient reports doing well with no unusual complaints.  He tells me he is considering transitioning to a home exercise program.     Vitals:    20 1408   Temp: 97.5 °F (36.4 °C)     OBJECTIVE:     Exam:  The incision is well healed. No sign of infection. Range of motion is measured at 90° to full extension. The calf is soft and nontender with a negative Homans sign.  Gait is reciprocal heel-to-toe and only mildly antalgic.    DIAGNOSTIC STUDIES    Xrays: 3 views of the right knee (AP, lateral, and sunrise) were ordered and reviewed for evaluation of recent knee replacement. They demonstrate a well positioned, well aligned knee replacement without complicating factors noted. In comparison with previous films there has been no change.    ASSESSMENT:  6 week status post right knee replacement    PLAN:   Continue with formal PT as prescribed.  He needs to work on increasing his flexion.  Patient agreed to continue PT for now.  He will follow up in 6 weeks.        Arlette Allred, APRN     2020

## 2020-03-11 ENCOUNTER — OFFICE VISIT (OUTPATIENT)
Dept: ORTHOPEDIC SURGERY | Facility: CLINIC | Age: 60
End: 2020-03-11

## 2020-03-11 VITALS — TEMPERATURE: 97.8 F | WEIGHT: 232 LBS | BODY MASS INDEX: 29.77 KG/M2 | HEIGHT: 74 IN

## 2020-03-11 DIAGNOSIS — M25.562 LEFT KNEE PAIN, UNSPECIFIED CHRONICITY: Primary | ICD-10-CM

## 2020-03-11 DIAGNOSIS — Z96.651 STATUS POST TOTAL RIGHT KNEE REPLACEMENT: Primary | ICD-10-CM

## 2020-03-11 DIAGNOSIS — M17.10 ARTHRITIS OF KNEE: ICD-10-CM

## 2020-03-11 DIAGNOSIS — Z96.651 STATUS POST TOTAL RIGHT KNEE REPLACEMENT: ICD-10-CM

## 2020-03-11 PROCEDURE — 73562 X-RAY EXAM OF KNEE 3: CPT | Performed by: ORTHOPAEDIC SURGERY

## 2020-03-11 PROCEDURE — 99024 POSTOP FOLLOW-UP VISIT: CPT | Performed by: ORTHOPAEDIC SURGERY

## 2020-03-11 PROCEDURE — 20610 DRAIN/INJ JOINT/BURSA W/O US: CPT | Performed by: ORTHOPAEDIC SURGERY

## 2020-03-11 RX ORDER — METHYLPREDNISOLONE ACETATE 80 MG/ML
80 INJECTION, SUSPENSION INTRA-ARTICULAR; INTRALESIONAL; INTRAMUSCULAR; SOFT TISSUE
Status: COMPLETED | OUTPATIENT
Start: 2020-03-11 | End: 2020-03-11

## 2020-03-11 RX ORDER — MELOXICAM 15 MG/1
15 TABLET ORAL DAILY PRN
Qty: 30 TABLET | Refills: 1 | Status: SHIPPED | OUTPATIENT
Start: 2020-03-11 | End: 2022-09-09

## 2020-03-11 RX ADMIN — METHYLPREDNISOLONE ACETATE 80 MG: 80 INJECTION, SUSPENSION INTRA-ARTICULAR; INTRALESIONAL; INTRAMUSCULAR; SOFT TISSUE at 08:38

## 2020-03-11 NOTE — PROGRESS NOTES
Gui Zamora : 1960 MRN: [id number] DATE: 3/11/2020    DIAGNOSIS: 3 month follow up right TKA, left knee pain    SUBJECTIVE:  Patient returns today for 3 month follow up of right total knee replacement.  Patient reports doing well with no unusual complaints.  He is very happy with his progress.  He has a little stiffness in flexion but he says things are otherwise going well.  His only complaint is the left knee.  Meloxicam helps and he would like to get that refilled.  He would also like to get the left knee injected again today.  Denies any mechanical symptoms.  He describes a mild aching discomfort, predominantly along the medial side of the knee.    Vitals:    20 0821   Temp: 97.8 °F (36.6 °C)       OBJECTIVE:     Exam: Right knee is examined.  The incision is well healed. No sign of infection. Range of motion is measured at full extension to about 110 degrees of flexion. The calf is soft and nontender with a negative Homans sign.  Gait is reciprocal heel-to-toe and nonantalgic.    Left knee is briefly examined.  Skin is benign.  Trace effusion.  Moderate medial joint line tenderness.    DIAGNOSTIC STUDIES    Xrays: 3 views of the right knee (AP, lateral, and sunrise) were ordered and reviewed for evaluation of recent knee replacement. They demonstrate a well positioned, well aligned knee replacement without complicating factors noted. In comparison with previous films there has been no change.  He has mild arthritis in the left knee but no profound degenerative changes.    ASSESSMENT: 1.  3 months status post right knee replacement  2.  Left knee chondromalacia, possible degenerative meniscal tear    PLAN: He needs to continue working on his range of motion for the right knee.  I expect that things will continue to improve.  We discussed antibiotic prophylaxis recommendations.  I am going to see him back in 6 months for the right knee.    For the left knee, we discussed further work-up with  an MRI.  He is not ready to consider that option.  He wants to try getting the injection repeated.  The risk, benefits and alternatives were discussed but he consented and the injection was performed as described below.  He will follow-up as needed.    Joao Lentz MD  3/11/2020     Large Joint Arthrocentesis: L knee  Date/Time: 3/11/2020 8:38 AM  Consent given by: patient  Site marked: site marked  Timeout: Immediately prior to procedure a time out was called to verify the correct patient, procedure, equipment, support staff and site/side marked as required   Supporting Documentation  Indications: pain and joint swelling   Procedure Details  Location: knee - L knee  Preparation: Patient was prepped and draped in the usual sterile fashion  Needle gauge: 21 G.  Approach: anterolateral  Medications administered: 2 mL lidocaine (cardiac); 80 mg methylPREDNISolone acetate 80 MG/ML  Patient tolerance: patient tolerated the procedure well with no immediate complications

## 2020-10-30 ENCOUNTER — OFFICE VISIT (OUTPATIENT)
Dept: ORTHOPEDIC SURGERY | Facility: CLINIC | Age: 60
End: 2020-10-30

## 2020-10-30 VITALS — HEIGHT: 73 IN | WEIGHT: 230 LBS | BODY MASS INDEX: 30.48 KG/M2 | TEMPERATURE: 96.8 F

## 2020-10-30 DIAGNOSIS — Z96.651 S/P TKR (TOTAL KNEE REPLACEMENT), RIGHT: Primary | ICD-10-CM

## 2020-10-30 PROCEDURE — 73562 X-RAY EXAM OF KNEE 3: CPT | Performed by: ORTHOPAEDIC SURGERY

## 2020-10-30 PROCEDURE — 99212 OFFICE O/P EST SF 10 MIN: CPT | Performed by: ORTHOPAEDIC SURGERY

## 2020-10-30 RX ORDER — MELOXICAM 15 MG/1
15 TABLET ORAL DAILY PRN
Qty: 30 TABLET | Refills: 2 | Status: SHIPPED | OUTPATIENT
Start: 2020-10-30 | End: 2022-09-09

## 2020-10-30 NOTE — PROGRESS NOTES
"Gui Zamora     : 1960     MRN: [id number]    DATE: 10/30/2020    DIAGNOSIS:  Annual follow up right total knee arthroplasty    SUBJECTIVE:  Patient returns today for annual follow up of a right total knee replacement. Patient reports doing well with no unusual complaints. Denies any limitations due to the right knee.  He reports mild left knee symptoms which were improved with the last injection.    OBJECTIVE:  Temp 96.8 °F (36 °C) (Temporal)   Ht 185.4 cm (73\")   Wt 104 kg (230 lb)   BMI 30.34 kg/m²   Family History   Problem Relation Age of Onset   • Diabetes Other    • Hypertension Other    • Diabetes Father    • Hypertension Father    • Malig Hyperthermia Neg Hx      Past Medical History:   Diagnosis Date   • Arthritis      Past Surgical History:   Procedure Laterality Date   • COLONOSCOPY     • HERNIA REPAIR     • TOTAL KNEE ARTHROPLASTY Right 2019    Procedure: TOTAL KNEE ARTHROPLASTY;  Surgeon: Joao Lentz MD;  Location: [facility];  Service: Orthopedics   • UMBILICAL HERNIA REPAIR       Social History     Socioeconomic History   • Marital status:      Spouse name: Not on file   • Number of children: Not on file   • Years of education: Not on file   • Highest education level: Not on file   Tobacco Use   • Smoking status: Never Smoker   • Smokeless tobacco: Never Used   Substance and Sexual Activity   • Alcohol use: Yes     Comment: 2 drinks a week    • Drug use: No       Review of Systems:   A 14 point review of systems is reviewed with the patient.  Pertinent positives are listed above.  All others negative.    Exam:  The incision is well healed.  Range of motion is measured at 0 to 125.  The calf is soft and nontender with a negative Homans sign.  Alignment is neutral.  Good quad strength. There is no evidence of varus/valgus or flexion instability. No effusion.  Intact sensation to light touch.  Palpable pedal pulses    DIAGNOSTIC STUDIES    Xrays: AP, merchant and " lateral views of the right knee were ordered and reviewed for evaluation of recent knee replacement.  The x-rays demonstrate a well positioned, well aligned knee replacement without complicating factors noted.  In comparison with previous films there has been no change.    ASSESSMENT:  1.  Annual follow up right knee replacement.  2.  Left knee pain, mild arthritis    PLAN: Appropriate activity modifications and restrictions discussed.  Antibiotic prophylaxis recommendations discussed.  Follow-up annually.  I did agree to refill the mobic for his left knee pain which is just mild at this point.    Joao Lentz MD

## 2021-03-22 ENCOUNTER — BULK ORDERING (OUTPATIENT)
Dept: CASE MANAGEMENT | Facility: OTHER | Age: 61
End: 2021-03-22

## 2021-03-22 DIAGNOSIS — Z23 IMMUNIZATION DUE: ICD-10-CM

## 2021-12-10 ENCOUNTER — TELEPHONE (OUTPATIENT)
Dept: ORTHOPEDIC SURGERY | Facility: CLINIC | Age: 61
End: 2021-12-10

## 2021-12-10 NOTE — TELEPHONE ENCOUNTER
Caller: SERGIO  Relationship to Patient: SELF    Phone Number: [phone]  Reason for Call: PT CALLED STATING THAT HE IS HAVING DENTAL WORK COMPLETED 12/13/2021. HE STATES THAT HIS DENTIST OFFICE IS WILLING TO CALL IN RX IF DR. HANSON LETS THEM KNOW WHAT ANTIBIOTIC HE NEEDS TO BE ON. PT IS 1 YR POST OP TOTAL KNEE REPLACEMENT. PLEASE ADVISE PT AT ABOVE PHONE NUMBER

## 2021-12-10 NOTE — TELEPHONE ENCOUNTER
SINCE PT IS 1 YEAR PO HE NO LONGER NEEDS ANTIBIOTIC. INFORMED PT AND HE GAVE VERBAL UNDERSTANDING.

## 2022-08-10 ENCOUNTER — TELEPHONE (OUTPATIENT)
Dept: FAMILY MEDICINE CLINIC | Facility: CLINIC | Age: 62
End: 2022-08-10

## 2022-08-10 DIAGNOSIS — Z00.00 ANNUAL PHYSICAL EXAM: Primary | ICD-10-CM

## 2022-08-10 NOTE — TELEPHONE ENCOUNTER
Caller: Gui Zamora    Relationship to patient: Self    Best call back number:   [phone]  Patient is needing:  WAS THE PATIENT PCP BACK IN 2019 AND PATIENT IS WONDERING IF HE CAN PICK HIM BACK UP AS A PATIENT AGAIN. CALL AND  ADVISE

## 2022-09-09 ENCOUNTER — OFFICE VISIT (OUTPATIENT)
Dept: FAMILY MEDICINE CLINIC | Facility: CLINIC | Age: 62
End: 2022-09-09

## 2022-09-09 VITALS
SYSTOLIC BLOOD PRESSURE: 156 MMHG | WEIGHT: 232.7 LBS | TEMPERATURE: 97.1 F | OXYGEN SATURATION: 98 % | HEART RATE: 59 BPM | HEIGHT: 73 IN | BODY MASS INDEX: 30.84 KG/M2 | DIASTOLIC BLOOD PRESSURE: 95 MMHG

## 2022-09-09 DIAGNOSIS — Z00.00 HEALTH CARE MAINTENANCE: Primary | ICD-10-CM

## 2022-09-09 DIAGNOSIS — M54.31 SCIATICA OF RIGHT SIDE: ICD-10-CM

## 2022-09-09 PROCEDURE — 99213 OFFICE O/P EST LOW 20 MIN: CPT | Performed by: FAMILY MEDICINE

## 2022-09-09 PROCEDURE — 99396 PREV VISIT EST AGE 40-64: CPT | Performed by: FAMILY MEDICINE

## 2022-09-09 RX ORDER — GABAPENTIN 300 MG/1
300 CAPSULE ORAL 2 TIMES DAILY
Qty: 60 CAPSULE | Refills: 2 | Status: SHIPPED | OUTPATIENT
Start: 2022-09-09 | End: 2022-11-11

## 2022-09-09 NOTE — PROGRESS NOTES
"Chief Complaint  Annual Exam, Hip Pain (Shooting pain going down right leg starting at hip x 3 months ), and Insomnia (Cant stay asleep )    Subjective        Gui Zamora presents to [facility]  History of Present Illness    Here for annual healthcare maintenance visit.  Not seen him in about 3 years.  He states he stayed away because of COVID.  He states he has not had COVID-19.  He has had his first 3 COVID-19 vaccinations.  He quit smoking 20 years ago.  No heavy alcohol use.  Not getting much exercise.  He is having sciatica symptoms he states down the right leg.  From the right buttocks down to the right ankle.  Bothers him on and off.  Especially at nighttime.  He also wakes up middle the night and has trouble going back to sleep.  His PHQ 9 depression screen is overall unremarkable with a score of 6.  No GI or  symptoms.  He states he has been tested for sleep apnea and its been negative.  His wife also does not believe he has sleep apnea.    Objective   Vital Signs:  /95   Pulse 59   Temp 97.1 °F (36.2 °C) (Temporal)   Ht 185.4 cm (73\")   Wt 106 kg (232 lb 11.2 oz)   SpO2 98%   BMI 30.70 kg/m²   Estimated body mass index is 30.7 kg/m² as calculated from the following:    Height as of this encounter: 185.4 cm (73\").    Weight as of this encounter: 106 kg (232 lb 11.2 oz).          Physical Exam  Constitutional:       Appearance: Normal appearance.   HENT:      Head: Atraumatic.      Mouth/Throat:      Pharynx: Oropharynx is clear. No oropharyngeal exudate or posterior oropharyngeal erythema.   Eyes:      Conjunctiva/sclera: Conjunctivae normal.   Neck:      Thyroid: No thyroid mass, thyromegaly or thyroid tenderness.   Cardiovascular:      Rate and Rhythm: Normal rate and regular rhythm.      Pulses: Normal pulses.      Heart sounds: Normal heart sounds.   Pulmonary:      Effort: Pulmonary effort is normal.      Breath sounds: Normal breath sounds. "   Abdominal:      General: Abdomen is flat. There is no distension.      Palpations: Abdomen is soft. There is no mass.      Tenderness: There is no abdominal tenderness.      Hernia: No hernia is present.   Genitourinary:     Comments: Prostate is slightly enlarged.  No asymmetry.  No mass.  Musculoskeletal:         General: Normal range of motion.      Cervical back: Normal range of motion and neck supple. No muscular tenderness.      Comments: He has had pain to palpation at the right gluteal area with reproduction of sciatic symptoms.  Strength is good.  Gait is nonantalgic.  Good range of motion of the right hip.   Lymphadenopathy:      Cervical: No cervical adenopathy.   Skin:     General: Skin is warm and dry.      Findings: No rash.   Neurological:      General: No focal deficit present.      Mental Status: He is alert and oriented to person, place, and time.   Psychiatric:         Mood and Affect: Mood normal.        Result Review :  The following data was reviewed by: Fernandez Deutsch MD on 09/09/2022:  Common labs    Common Labsle 9/2/22 9/2/22 9/2/22    0947 0947 0947   Glucose 98     BUN 18     Creatinine 0.98     Sodium 140     Potassium 4.8     Chloride 103     Calcium 9.3     Total Protein 6.8     Albumin 4.80     Total Bilirubin 0.8     Alkaline Phosphatase 90     AST (SGOT) 20     ALT (SGPT) 21     WBC   5.88   Hemoglobin   15.7   Hematocrit   45.8   Platelets   223   Total Cholesterol  186    Triglycerides  96    HDL Cholesterol  31 (A)    LDL Cholesterol   137 (A)    (A) Abnormal value       Comments are available for some flowsheets but are not being displayed.                     Assessment and Plan   Diagnoses and all orders for this visit:    1. Health care maintenance (Primary)    2. Sciatica of right side  -     gabapentin (NEURONTIN) 300 MG capsule; Take 1 capsule by mouth 2 (Two) Times a Day. For sciatica  Dispense: 60 capsule; Refill: 2      Annual healthcare maintenance  visit.    Immunizations.  I recommend the omicron booster this fall.    Right-sided sciatica.  About 2 or 3 months duration.  Likely musculoskeletal in origin.  Holding off imaging at this time.  He is also having some insomnia.  On prescribing gabapentin 3 mg twice a day.  Side effects discussed.  Patient is aware this is a controlled substance and may cause withdrawal symptoms at high doses, which he will not be taking.  I will see him back in 6 weeks for follow-up.    Elevated blood pressure.  I am concerned about the diagnosis of hypertension.  He is going to be checking his blood pressure on a regular basis.  See patient discharge instructions.  See me in 6 weeks.    Prostate cancer screening.  Unremarkable prostate exam today.    Colon cancer screening.  Up-to-date.    Other preventative health practices discussed including regular exercise.    Moderate dyslipidemia.  To consider statin use in the future.         Follow Up   No follow-ups on file.  Patient was given instructions and counseling regarding his condition or for health maintenance advice. Please see specific information pulled into the AVS if appropriate.

## 2022-09-09 NOTE — PATIENT INSTRUCTIONS
For the sciatica symptoms I am recommending gabapentin 300mg by mouth twice a day.  Gabapentin is a neurological medicine that can also help reduce nerve pain.  May also may help you sleep.  If you have too much sedation during the day, just take the medication at nighttime only.    Your blood pressure is also is elevated.  This is very concerning.  I want you to check your blood pressure at home once or twice a day.  If you do not have a machine, buy a monitor.  [employer] is a good brand.  Sit for 5 minutes first before checking your blood pressure.  Write them down.  And send me numbers via the [employer] message in about 2 or 3 weeks.  I want to see you in 6 weeks for follow-up.

## 2022-11-01 ENCOUNTER — TELEPHONE (OUTPATIENT)
Dept: FAMILY MEDICINE CLINIC | Facility: CLINIC | Age: 62
End: 2022-11-01

## 2022-11-01 NOTE — TELEPHONE ENCOUNTER
Called pt about 11/4 afternoon appointment, dr harris will be leaving office early so called pt to reschedule. No answer lvm. Hub to read and schedule patient.

## 2022-11-11 ENCOUNTER — OFFICE VISIT (OUTPATIENT)
Dept: FAMILY MEDICINE CLINIC | Facility: CLINIC | Age: 62
End: 2022-11-11

## 2022-11-11 VITALS
BODY MASS INDEX: 30.68 KG/M2 | SYSTOLIC BLOOD PRESSURE: 131 MMHG | OXYGEN SATURATION: 99 % | WEIGHT: 231.5 LBS | DIASTOLIC BLOOD PRESSURE: 84 MMHG | HEART RATE: 71 BPM | TEMPERATURE: 97.1 F | HEIGHT: 73 IN

## 2022-11-11 DIAGNOSIS — I10 ESSENTIAL HYPERTENSION: Primary | ICD-10-CM

## 2022-11-11 PROCEDURE — 99213 OFFICE O/P EST LOW 20 MIN: CPT | Performed by: FAMILY MEDICINE

## 2022-11-11 RX ORDER — LOSARTAN POTASSIUM AND HYDROCHLOROTHIAZIDE 12.5; 5 MG/1; MG/1
0.5 TABLET ORAL DAILY
Qty: 90 TABLET | Refills: 1 | Status: SHIPPED | OUTPATIENT
Start: 2022-11-11

## 2022-11-11 NOTE — PROGRESS NOTES
"Chief Complaint  Hypertension    Subjective        Gui Zamora presents to [facility]  History of Present Illness     Hypertension.  New diagnosis.  Blood pressures averaging between 130 and 140 systolic.  Asymptomatic.  No chest pain no chest pressure no headaches no palpitations.  No troubles with urination.  No significant nocturia.  No previous treatment for hypertension.    Objective   Vital Signs:  /84   Pulse 71   Temp 97.1 °F (36.2 °C) (Temporal)   Ht 185.4 cm (73\")   Wt 105 kg (231 lb 8 oz)   SpO2 99%   BMI 30.54 kg/m²   Estimated body mass index is 30.54 kg/m² as calculated from the following:    Height as of this encounter: 185.4 cm (73\").    Weight as of this encounter: 105 kg (231 lb 8 oz).          Physical Exam  Vitals and nursing note reviewed.   Constitutional:       General: He is not in acute distress.     Appearance: He is well-developed.   Cardiovascular:      Rate and Rhythm: Normal rate and regular rhythm.      Heart sounds: Normal heart sounds.   Pulmonary:      Effort: Pulmonary effort is normal.      Breath sounds: Normal breath sounds.   Musculoskeletal:      Cervical back: Normal range of motion.   Skin:     General: Skin is warm and dry.   Psychiatric:         Mood and Affect: Mood normal.        Result Review :                Assessment and Plan   Diagnoses and all orders for this visit:    1. Essential hypertension (Primary)  -     CBC & Differential; Future  -     Comprehensive Metabolic Panel; Future  -     Lipid Panel; Future    Other orders  -     losartan-hydrochlorothiazide (Hyzaar) 50-12.5 MG per tablet; Take 0.5 tablets by mouth Daily.  Dispense: 90 tablet; Refill: 1      Hypertension.  I want him to start losartan hydrochlorothiazide 1/2 tablet daily.  Side effects discussed.  Benefits far outweigh any risks.  Efficacy of treatment discussed.  I will see him back in 5 to 6 months for recheck.  He is going to send me blood " pressure readings within 4 to 6 weeks.  May need to go up to a full tablet at that time.         Follow Up   No follow-ups on file.  Patient was given instructions and counseling regarding his condition or for health maintenance advice. Please see specific information pulled into the AVS if appropriate.

## 2022-12-15 ENCOUNTER — TELEPHONE (OUTPATIENT)
Dept: FAMILY MEDICINE CLINIC | Facility: CLINIC | Age: 62
End: 2022-12-15

## 2022-12-15 NOTE — TELEPHONE ENCOUNTER
Caller: Gui Zamora    Relationship to patient: Self    Best call back number: [phone]    Date of positive COVID19 test: 12/13       COVID19 symptoms: SINUS PRESSURE, COUGH UP MUCUS, FATIGUE     Date of initial quarantine: 12/13    Additional information or concerns: WOULD LIKE TO KNOW IF SOMETHING CAN BE CALLED IN TO HELP WITH THE SYMPTOMS     What is the patients preferred pharmacy:   [facility] [id number] - [place], [place] - [address] AT [facility] [phone] [facility] [phone] FX

## 2023-04-14 ENCOUNTER — OFFICE VISIT (OUTPATIENT)
Dept: FAMILY MEDICINE CLINIC | Facility: CLINIC | Age: 63
End: 2023-04-14
Payer: COMMERCIAL

## 2023-04-14 VITALS
OXYGEN SATURATION: 97 % | HEIGHT: 73 IN | SYSTOLIC BLOOD PRESSURE: 132 MMHG | BODY MASS INDEX: 31.7 KG/M2 | DIASTOLIC BLOOD PRESSURE: 84 MMHG | TEMPERATURE: 97.5 F | HEART RATE: 57 BPM | WEIGHT: 239.2 LBS

## 2023-04-14 DIAGNOSIS — Z00.00 HEALTH CARE MAINTENANCE: ICD-10-CM

## 2023-04-14 DIAGNOSIS — I10 ESSENTIAL HYPERTENSION: Primary | ICD-10-CM

## 2023-04-14 DIAGNOSIS — Z12.5 SCREENING PSA (PROSTATE SPECIFIC ANTIGEN): ICD-10-CM

## 2023-04-14 DIAGNOSIS — H61.22 IMPACTED CERUMEN OF LEFT EAR: ICD-10-CM

## 2023-04-14 DIAGNOSIS — H91.90 HEARING LOSS, UNSPECIFIED HEARING LOSS TYPE, UNSPECIFIED LATERALITY: ICD-10-CM

## 2023-04-14 DIAGNOSIS — I10 ESSENTIAL HYPERTENSION: ICD-10-CM

## 2023-04-14 NOTE — PROGRESS NOTES
"Answers for HPI/ROS submitted by the patient on 4/14/2023  What is the primary reason for your visit?: High Blood Pressure    Chief Complaint  Hypertension         Subjective        Gui Zamora presents to [facility]  History of Present Illness     Follow-up hypertension.  Patient takes one half of a losartan hydrochlorothiazide 50-12 0.5 tablet.  May be some increased urination otherwise feels fine.  He was checking his blood pressure for period of time.  It was running better.  In the 120s or 130s.  He has not checked recently because he moved homes and cannot find his blood pressure cuff.  Otherwise doing well.  He states he is having trouble hearing.  Other people noted also.  He does have some wax in his left ear today.    Objective   Vital Signs:  /84   Pulse 57   Temp 97.5 °F (36.4 °C) (Temporal)   Ht 185.4 cm (73\")   Wt 109 kg (239 lb 3.2 oz)   SpO2 97%   BMI 31.56 kg/m²   Estimated body mass index is 31.56 kg/m² as calculated from the following:    Height as of this encounter: 185.4 cm (73\").    Weight as of this encounter: 109 kg (239 lb 3.2 oz).             Physical Exam  Vitals and nursing note reviewed.   Constitutional:       General: He is not in acute distress.     Appearance: He is well-developed.   HENT:      Right Ear: Tympanic membrane, ear canal and external ear normal.      Left Ear: There is impacted cerumen.   Cardiovascular:      Rate and Rhythm: Normal rate and regular rhythm.      Heart sounds: Normal heart sounds.   Pulmonary:      Effort: Pulmonary effort is normal.      Breath sounds: Normal breath sounds.   Musculoskeletal:      Cervical back: Normal range of motion.   Skin:     General: Skin is warm and dry.   Psychiatric:         Mood and Affect: Mood normal.        Result Review :  The following data was reviewed by: Fernandez Deutcsh MD on 04/14/2023:  Common labs        9/2/2022    09:47   Common Labs   Glucose 98     BUN 18   "   Creatinine 0.98     Sodium 140     Potassium 4.8     Chloride 103     Calcium 9.3     Total Protein 6.8     Albumin 4.80     Total Bilirubin 0.8     Alkaline Phosphatase 90     AST (SGOT) 20     ALT (SGPT) 21     WBC 5.88     Hemoglobin 15.7     Hematocrit 45.8     Platelets 223     Total Cholesterol 186     Triglycerides 96     HDL Cholesterol 31     LDL Cholesterol  137                    Assessment and Plan   Diagnoses and all orders for this visit:    1. Essential hypertension (Primary)  -     Comprehensive Metabolic Panel; Future  -     CBC & Differential; Future    2. Health care maintenance  -     Ambulatory Referral to Audiology  -     Comprehensive Metabolic Panel; Future  -     CBC & Differential; Future  -     Lipid Panel; Future  -     PSA Screen; Future    3. Screening PSA (prostate specific antigen)  -     PSA Screen; Future    4. Hearing loss, unspecified hearing loss type, unspecified laterality  -     Ambulatory Referral to Audiology    5. Impacted cerumen of left ear      Hypertension.  Better control.  Continue losartan hydrochlorothiazide 1/2 tablet.  Checking lab work today.  I will see him back in about 6 months for annual complete physical examination with repeat lab work including PSA testing.    Cerumen impaction left ear.  Removed today with lavage and currette by MA and MD.    Hearing loss.  Likely beyond the cerumen impaction.  I am recommending audiology evaluation.  Referral placed.           Follow Up   No follow-ups on file.  Patient was given instructions and counseling regarding his condition or for health maintenance advice. Please see specific information pulled into the AVS if appropriate.

## 2023-04-15 LAB
ALBUMIN SERPL-MCNC: 4.4 G/DL (ref 3.5–5.2)
ALBUMIN/GLOB SERPL: 1.9 G/DL
ALP SERPL-CCNC: 78 U/L (ref 39–117)
ALT SERPL-CCNC: 22 U/L (ref 1–41)
AST SERPL-CCNC: 19 U/L (ref 1–40)
BASOPHILS # BLD AUTO: 0.06 10*3/MM3 (ref 0–0.2)
BASOPHILS NFR BLD AUTO: 0.9 % (ref 0–1.5)
BILIRUB SERPL-MCNC: 0.9 MG/DL (ref 0–1.2)
BUN SERPL-MCNC: 18 MG/DL (ref 8–23)
BUN/CREAT SERPL: 16.7 (ref 7–25)
CALCIUM SERPL-MCNC: 9.4 MG/DL (ref 8.6–10.5)
CHLORIDE SERPL-SCNC: 103 MMOL/L (ref 98–107)
CHOLEST SERPL-MCNC: 180 MG/DL (ref 0–200)
CO2 SERPL-SCNC: 25.8 MMOL/L (ref 22–29)
CREAT SERPL-MCNC: 1.08 MG/DL (ref 0.76–1.27)
EGFRCR SERPLBLD CKD-EPI 2021: 76.6 ML/MIN/1.73
EOSINOPHIL # BLD AUTO: 0.34 10*3/MM3 (ref 0–0.4)
EOSINOPHIL NFR BLD AUTO: 5.2 % (ref 0.3–6.2)
ERYTHROCYTE [DISTWIDTH] IN BLOOD BY AUTOMATED COUNT: 13.5 % (ref 12.3–15.4)
GLOBULIN SER CALC-MCNC: 2.3 GM/DL
GLUCOSE SERPL-MCNC: 115 MG/DL (ref 65–99)
HCT VFR BLD AUTO: 47.7 % (ref 37.5–51)
HDLC SERPL-MCNC: 31 MG/DL (ref 40–60)
HGB BLD-MCNC: 16.1 G/DL (ref 13–17.7)
IMM GRANULOCYTES # BLD AUTO: 0.03 10*3/MM3 (ref 0–0.05)
IMM GRANULOCYTES NFR BLD AUTO: 0.5 % (ref 0–0.5)
LDLC SERPL CALC-MCNC: 128 MG/DL (ref 0–100)
LYMPHOCYTES # BLD AUTO: 2.45 10*3/MM3 (ref 0.7–3.1)
LYMPHOCYTES NFR BLD AUTO: 37.7 % (ref 19.6–45.3)
MCH RBC QN AUTO: 30.4 PG (ref 26.6–33)
MCHC RBC AUTO-ENTMCNC: 33.8 G/DL (ref 31.5–35.7)
MCV RBC AUTO: 90 FL (ref 79–97)
MONOCYTES # BLD AUTO: 0.65 10*3/MM3 (ref 0.1–0.9)
MONOCYTES NFR BLD AUTO: 10 % (ref 5–12)
NEUTROPHILS # BLD AUTO: 2.97 10*3/MM3 (ref 1.7–7)
NEUTROPHILS NFR BLD AUTO: 45.7 % (ref 42.7–76)
NRBC BLD AUTO-RTO: 0 /100 WBC (ref 0–0.2)
PLATELET # BLD AUTO: 195 10*3/MM3 (ref 140–450)
POTASSIUM SERPL-SCNC: 4.4 MMOL/L (ref 3.5–5.2)
PROT SERPL-MCNC: 6.7 G/DL (ref 6–8.5)
RBC # BLD AUTO: 5.3 10*6/MM3 (ref 4.14–5.8)
SODIUM SERPL-SCNC: 140 MMOL/L (ref 136–145)
TRIGL SERPL-MCNC: 114 MG/DL (ref 0–150)
VLDLC SERPL CALC-MCNC: 21 MG/DL (ref 5–40)
WBC # BLD AUTO: 6.5 10*3/MM3 (ref 3.4–10.8)

## 2023-04-17 DIAGNOSIS — I10 ESSENTIAL HYPERTENSION: Primary | ICD-10-CM

## 2023-04-17 DIAGNOSIS — R73.01 IMPAIRED FASTING GLUCOSE: ICD-10-CM

## 2023-11-06 RX ORDER — LOSARTAN POTASSIUM AND HYDROCHLOROTHIAZIDE 12.5; 5 MG/1; MG/1
0.5 TABLET ORAL DAILY
Qty: 45 TABLET | Refills: 1 | Status: SHIPPED | OUTPATIENT
Start: 2023-11-06

## 2023-11-06 NOTE — TELEPHONE ENCOUNTER
Rx Refill Note  Requested Prescriptions     Pending Prescriptions Disp Refills    losartan-hydrochlorothiazide (HYZAAR) 50-12.5 MG per tablet [Pharmacy Med Name: LOSARTAN-HCTZ 50-12.5 MG TAB] 45 tablet 1     Sig: TAKE 1/2 TABLET BY MOUTH DAILY      Last office visit with prescribing clinician: 4/14/2023   Last telemedicine visit with prescribing clinician: Visit date not found   Next office visit with prescribing clinician: 12/22/2023                         Would you like a call back once the refill request has been completed: [] Yes [] No    If the office needs to give you a call back, can they leave a voicemail: [] Yes [] No    Aysha Jimenes  11/06/23, 16:19 EST

## 2023-12-15 DIAGNOSIS — R73.01 IMPAIRED FASTING GLUCOSE: ICD-10-CM

## 2023-12-15 DIAGNOSIS — Z00.00 HEALTH CARE MAINTENANCE: ICD-10-CM

## 2023-12-15 DIAGNOSIS — I10 ESSENTIAL HYPERTENSION: ICD-10-CM

## 2023-12-15 DIAGNOSIS — Z12.5 SCREENING PSA (PROSTATE SPECIFIC ANTIGEN): ICD-10-CM

## 2023-12-16 LAB
ALBUMIN SERPL-MCNC: 4.3 G/DL (ref 3.5–5.2)
ALBUMIN/GLOB SERPL: 1.7 G/DL
ALP SERPL-CCNC: 74 U/L (ref 39–117)
ALT SERPL-CCNC: 30 U/L (ref 1–41)
AST SERPL-CCNC: 23 U/L (ref 1–40)
BASOPHILS # BLD AUTO: 0.03 10*3/MM3 (ref 0–0.2)
BASOPHILS NFR BLD AUTO: 0.5 % (ref 0–1.5)
BILIRUB SERPL-MCNC: 0.9 MG/DL (ref 0–1.2)
BUN SERPL-MCNC: 18 MG/DL (ref 8–23)
BUN/CREAT SERPL: 16.8 (ref 7–25)
CALCIUM SERPL-MCNC: 9.5 MG/DL (ref 8.6–10.5)
CHLORIDE SERPL-SCNC: 105 MMOL/L (ref 98–107)
CHOLEST SERPL-MCNC: 181 MG/DL (ref 0–200)
CO2 SERPL-SCNC: 27.6 MMOL/L (ref 22–29)
CREAT SERPL-MCNC: 1.07 MG/DL (ref 0.76–1.27)
EGFRCR SERPLBLD CKD-EPI 2021: 77.5 ML/MIN/1.73
EOSINOPHIL # BLD AUTO: 0.27 10*3/MM3 (ref 0–0.4)
EOSINOPHIL NFR BLD AUTO: 4.4 % (ref 0.3–6.2)
ERYTHROCYTE [DISTWIDTH] IN BLOOD BY AUTOMATED COUNT: 13.3 % (ref 12.3–15.4)
GLOBULIN SER CALC-MCNC: 2.6 GM/DL
GLUCOSE SERPL-MCNC: 104 MG/DL (ref 65–99)
HBA1C MFR BLD: 5.9 % (ref 4.8–5.6)
HCT VFR BLD AUTO: 46.9 % (ref 37.5–51)
HDLC SERPL-MCNC: 29 MG/DL (ref 40–60)
HGB BLD-MCNC: 15.9 G/DL (ref 13–17.7)
IMM GRANULOCYTES # BLD AUTO: 0.03 10*3/MM3 (ref 0–0.05)
IMM GRANULOCYTES NFR BLD AUTO: 0.5 % (ref 0–0.5)
LDLC SERPL CALC-MCNC: 130 MG/DL (ref 0–100)
LYMPHOCYTES # BLD AUTO: 2.34 10*3/MM3 (ref 0.7–3.1)
LYMPHOCYTES NFR BLD AUTO: 37.8 % (ref 19.6–45.3)
MCH RBC QN AUTO: 30.6 PG (ref 26.6–33)
MCHC RBC AUTO-ENTMCNC: 33.9 G/DL (ref 31.5–35.7)
MCV RBC AUTO: 90.4 FL (ref 79–97)
MONOCYTES # BLD AUTO: 0.7 10*3/MM3 (ref 0.1–0.9)
MONOCYTES NFR BLD AUTO: 11.3 % (ref 5–12)
NEUTROPHILS # BLD AUTO: 2.82 10*3/MM3 (ref 1.7–7)
NEUTROPHILS NFR BLD AUTO: 45.5 % (ref 42.7–76)
NRBC BLD AUTO-RTO: 0 /100 WBC (ref 0–0.2)
PLATELET # BLD AUTO: 252 10*3/MM3 (ref 140–450)
POTASSIUM SERPL-SCNC: 4.5 MMOL/L (ref 3.5–5.2)
PROT SERPL-MCNC: 6.9 G/DL (ref 6–8.5)
PSA SERPL-MCNC: 4.86 NG/ML (ref 0–4)
RBC # BLD AUTO: 5.19 10*6/MM3 (ref 4.14–5.8)
SODIUM SERPL-SCNC: 141 MMOL/L (ref 136–145)
TRIGL SERPL-MCNC: 122 MG/DL (ref 0–150)
VLDLC SERPL CALC-MCNC: 22 MG/DL (ref 5–40)
WBC # BLD AUTO: 6.19 10*3/MM3 (ref 3.4–10.8)

## 2023-12-22 ENCOUNTER — TELEPHONE (OUTPATIENT)
Dept: FAMILY MEDICINE CLINIC | Facility: CLINIC | Age: 63
End: 2023-12-22

## 2023-12-22 ENCOUNTER — OFFICE VISIT (OUTPATIENT)
Dept: FAMILY MEDICINE CLINIC | Facility: CLINIC | Age: 63
End: 2023-12-22
Payer: COMMERCIAL

## 2023-12-22 VITALS
OXYGEN SATURATION: 100 % | BODY MASS INDEX: 33.13 KG/M2 | HEIGHT: 73 IN | DIASTOLIC BLOOD PRESSURE: 85 MMHG | HEART RATE: 56 BPM | SYSTOLIC BLOOD PRESSURE: 133 MMHG | WEIGHT: 250 LBS | TEMPERATURE: 97.5 F

## 2023-12-22 DIAGNOSIS — R97.20 ELEVATED PSA: ICD-10-CM

## 2023-12-22 DIAGNOSIS — I10 ESSENTIAL HYPERTENSION: ICD-10-CM

## 2023-12-22 DIAGNOSIS — Z00.00 HEALTH CARE MAINTENANCE: Primary | ICD-10-CM

## 2023-12-22 DIAGNOSIS — Z12.5 SCREENING PSA (PROSTATE SPECIFIC ANTIGEN): ICD-10-CM

## 2023-12-22 DIAGNOSIS — R73.01 IMPAIRED FASTING GLUCOSE: ICD-10-CM

## 2023-12-22 NOTE — PROGRESS NOTES
"Chief Complaint  Annual Exam and follow-up hypertension.    Subjective        Gui Zamora presents to [facility]  History of Present Illness    Annual healthcare maintenance visit.  Non-smoker.  No heavy alcohol use.  Getting some exercise.  He states he been gaining weight by eating too much.  His A1c is slightly elevated in the prediabetes level.  His recent glucose was 104 which is an improvement.  Normal creatinine.  He continues on losartan hydrochlorothiazide 50-12 0.5.  His blood pressure today is just minimally elevated at 133 systolic.  He does not check it at home.  No  symptoms.  He otherwise feels well.    Objective   Vital Signs:  /85   Pulse 56   Temp 97.5 °F (36.4 °C) (Temporal)   Ht 185.4 cm (73\")   Wt 113 kg (250 lb)   SpO2 100%   BMI 32.98 kg/m²   Estimated body mass index is 32.98 kg/m² as calculated from the following:    Height as of this encounter: 185.4 cm (73\").    Weight as of this encounter: 113 kg (250 lb).             Physical Exam  Constitutional:       Appearance: Normal appearance.   HENT:      Head: Atraumatic.      Mouth/Throat:      Pharynx: Oropharynx is clear. No oropharyngeal exudate or posterior oropharyngeal erythema.   Eyes:      Conjunctiva/sclera: Conjunctivae normal.   Neck:      Thyroid: No thyroid mass, thyromegaly or thyroid tenderness.   Cardiovascular:      Rate and Rhythm: Normal rate and regular rhythm.      Pulses: Normal pulses.      Heart sounds: Normal heart sounds.   Pulmonary:      Effort: Pulmonary effort is normal.      Breath sounds: Normal breath sounds.   Abdominal:      General: Abdomen is flat. There is no distension.      Palpations: Abdomen is soft. There is no mass.      Tenderness: There is no abdominal tenderness.      Hernia: No hernia is present.   Genitourinary:     Comments: I can palpate the presenting 80% surface of his prostate.  It is slightly enlarged.  I feel no discrete nodules or masses. "  But the exam is incomplete.  Musculoskeletal:         General: Normal range of motion.      Cervical back: Normal range of motion and neck supple. No muscular tenderness.   Lymphadenopathy:      Cervical: No cervical adenopathy.   Skin:     General: Skin is warm and dry.      Findings: No rash.   Neurological:      General: No focal deficit present.      Mental Status: He is alert and oriented to person, place, and time.   Psychiatric:         Mood and Affect: Mood normal.        Result Review :  The following data was reviewed by: Fernandez Deutsch MD on 12/22/2023:  Common labs          4/14/2023    08:52 12/15/2023    08:40   Common Labs   Glucose 115  104    BUN 18  18    Creatinine 1.08  1.07    Sodium 140  141    Potassium 4.4  4.5    Chloride 103  105    Calcium 9.4  9.5    Total Protein 6.7  6.9    Albumin 4.4  4.3    Total Bilirubin 0.9  0.9    Alkaline Phosphatase 78  74    AST (SGOT) 19  23    ALT (SGPT) 22  30    WBC 6.50  6.19    Hemoglobin 16.1  15.9    Hematocrit 47.7  46.9    Platelets 195  252    Total Cholesterol 180  181    Triglycerides 114  122    HDL Cholesterol 31  29    LDL Cholesterol  128  130    Hemoglobin A1C  5.90    PSA  4.860                   Assessment and Plan   Diagnoses and all orders for this visit:    1. Health care maintenance (Primary)  -     Hemoglobin A1c; Future  -     CBC & Differential; Future  -     Comprehensive Metabolic Panel; Future  -     Lipid Panel; Future    2. Essential hypertension  -     Hemoglobin A1c; Future  -     CBC & Differential; Future  -     Comprehensive Metabolic Panel; Future  -     Lipid Panel; Future    3. Impaired fasting glucose  -     Hemoglobin A1c; Future  -     CBC & Differential; Future  -     Comprehensive Metabolic Panel; Future  -     Lipid Panel; Future    4. Elevated PSA  -     Ambulatory Referral to Urology    5. Screening PSA (prostate specific antigen)  -     PSA Screen; Future      Annual healthcare maintenance  visit.    Immunizations.  Up-to-date COVID booster recommended.  Prevnar 20 at age 65.    Colon cancer screening.  Coming due 2025.    Prostate cancer screening.  His PSA is elevated at almost 5.  No  symptoms.  His prostate is slightly enlarged otherwise unremarkable exam today.  I had a long discussion with patient today.  While this could represent a early prostate cancer, it may also be a false positive test.  I am recommending consultation with a urologist.  The referral has been placed.  I also gave the patient the urologist's name and he can look it up and call them directly.  He understands the importance of following up with them.    Hypertension.  Continue losartan hydrochlorothiazide.  I will see him back in 1 year.  Sooner as needed.    Other preventative health practices discussed including regular exercise, diet tracking and diet planning, alcohol use counseling, and other issues.           Follow Up   No follow-ups on file.  Patient was given instructions and counseling regarding his condition or for health maintenance advice. Please see specific information pulled into the AVS if appropriate.

## 2024-05-02 RX ORDER — LOSARTAN POTASSIUM AND HYDROCHLOROTHIAZIDE 12.5; 5 MG/1; MG/1
0.5 TABLET ORAL DAILY
Qty: 45 TABLET | Refills: 1 | Status: SHIPPED | OUTPATIENT
Start: 2024-05-02

## 2024-05-02 NOTE — TELEPHONE ENCOUNTER
Rx Refill Note  Requested Prescriptions     Pending Prescriptions Disp Refills    losartan-hydrochlorothiazide (HYZAAR) 50-12.5 MG per tablet [Pharmacy Med Name: LOSARTAN-HCTZ 50-12.5 MG TAB] 45 tablet 1     Sig: TAKE 1/2 TABLET BY MOUTH DAILY      Last office visit with prescribing clinician: 12/22/2023   Last telemedicine visit with prescribing clinician: Visit date not found   Next office visit with prescribing clinician: 12/27/2024                         Would you like a call back once the refill request has been completed: [] Yes [] No    If the office needs to give you a call back, can they leave a voicemail: [] Yes [] No    Aysha Jimenes  05/02/24, 08:02 EDT

## 2024-05-08 RX ORDER — LOSARTAN POTASSIUM AND HYDROCHLOROTHIAZIDE 12.5; 5 MG/1; MG/1
0.5 TABLET ORAL DAILY
Qty: 45 TABLET | Refills: 1 | Status: SHIPPED | OUTPATIENT
Start: 2024-05-08

## 2024-11-04 RX ORDER — LOSARTAN POTASSIUM AND HYDROCHLOROTHIAZIDE 12.5; 5 MG/1; MG/1
0.5 TABLET ORAL DAILY
Qty: 45 TABLET | Refills: 1 | Status: SHIPPED | OUTPATIENT
Start: 2024-11-04

## 2024-11-04 NOTE — TELEPHONE ENCOUNTER
Rx Refill Note  Requested Prescriptions     Pending Prescriptions Disp Refills    losartan-hydrochlorothiazide (HYZAAR) 50-12.5 MG per tablet [Pharmacy Med Name: Losartan Potassium-HCTZ Oral Tablet 50-12.5 MG] 45 tablet 1     Sig: TAKE 1/2 TABLET EVERY DAY      Last office visit with prescribing clinician: 12/22/2023   Last telemedicine visit with prescribing clinician: Visit date not found   Next office visit with prescribing clinician: 1/24/2025                         Would you like a call back once the refill request has been completed: [] Yes [] No    If the office needs to give you a call back, can they leave a voicemail: [] Yes [] No    Aysha Jimenes  11/04/24, 15:42 EST

## 2025-01-24 ENCOUNTER — OFFICE VISIT (OUTPATIENT)
Dept: FAMILY MEDICINE CLINIC | Facility: CLINIC | Age: 65
End: 2025-01-24
Payer: MEDICARE

## 2025-01-24 VITALS
WEIGHT: 242 LBS | BODY MASS INDEX: 32.07 KG/M2 | DIASTOLIC BLOOD PRESSURE: 80 MMHG | HEIGHT: 73 IN | HEART RATE: 61 BPM | TEMPERATURE: 97.8 F | OXYGEN SATURATION: 95 % | SYSTOLIC BLOOD PRESSURE: 145 MMHG

## 2025-01-24 DIAGNOSIS — R73.01 IMPAIRED FASTING GLUCOSE: ICD-10-CM

## 2025-01-24 DIAGNOSIS — I10 ESSENTIAL HYPERTENSION: ICD-10-CM

## 2025-01-24 DIAGNOSIS — Z87.891 PERSONAL HISTORY OF TOBACCO USE, PRESENTING HAZARDS TO HEALTH: ICD-10-CM

## 2025-01-24 DIAGNOSIS — Z00.00 MEDICARE WELCOME EXAM: Primary | ICD-10-CM

## 2025-01-24 DIAGNOSIS — R91.1 LUNG NODULE: ICD-10-CM

## 2025-01-24 PROCEDURE — 90662 IIV NO PRSV INCREASED AG IM: CPT | Performed by: FAMILY MEDICINE

## 2025-01-24 PROCEDURE — 99214 OFFICE O/P EST MOD 30 MIN: CPT | Performed by: FAMILY MEDICINE

## 2025-01-24 PROCEDURE — 1125F AMNT PAIN NOTED PAIN PRSNT: CPT | Performed by: FAMILY MEDICINE

## 2025-01-24 PROCEDURE — G2211 COMPLEX E/M VISIT ADD ON: HCPCS | Performed by: FAMILY MEDICINE

## 2025-01-24 PROCEDURE — 3077F SYST BP >= 140 MM HG: CPT | Performed by: FAMILY MEDICINE

## 2025-01-24 PROCEDURE — G0008 ADMIN INFLUENZA VIRUS VAC: HCPCS | Performed by: FAMILY MEDICINE

## 2025-01-24 PROCEDURE — G0439 PPPS, SUBSEQ VISIT: HCPCS | Performed by: FAMILY MEDICINE

## 2025-01-24 PROCEDURE — 96160 PT-FOCUSED HLTH RISK ASSMT: CPT | Performed by: FAMILY MEDICINE

## 2025-01-24 PROCEDURE — 3079F DIAST BP 80-89 MM HG: CPT | Performed by: FAMILY MEDICINE

## 2025-01-24 RX ORDER — ATORVASTATIN CALCIUM 10 MG/1
10 TABLET, FILM COATED ORAL DAILY
Qty: 90 TABLET | Refills: 3 | Status: SHIPPED | OUTPATIENT
Start: 2025-01-24

## 2025-01-24 NOTE — PROGRESS NOTES
" Subjective   The ABCs of the Annual Wellness Visit  Medicare Wellness Visit      Gui Zamora is a 64 y.o. patient who presents for a Medicare Wellness Visit.    The following portions of the patient's history were reviewed and   updated as appropriate: allergies, current medications, past family history, past medical history, past social history, past surgical history, and problem list.    Compared to one year ago, the patient's physical   health is the same.  Compared to one year ago, the patient's mental   health is the same.    Recent Hospitalizations:  He was not admitted to the hospital during the last year.     Current Medical Providers:  Patient Care Team:  Fernandez Deutsch MD as PCP - General  Fernandez Deutsch MD as PCP - Family Medicine    Outpatient Medications Prior to Visit   Medication Sig Dispense Refill    losartan-hydrochlorothiazide (HYZAAR) 50-12.5 MG per tablet TAKE 1/2 TABLET EVERY DAY 45 tablet 1     No facility-administered medications prior to visit.     No opioid medication identified on active medication list. I have reviewed chart for other potential  high risk medication/s and harmful drug interactions in the elderly.      Aspirin is not on active medication list.  Aspirin use is not indicated based on review of current medical condition/s. Risk of harm outweighs potential benefits.  .    Patient Active Problem List   Diagnosis    Primary osteoarthritis of right knee    Plantar fasciitis    Impingement syndrome of left ankle    Pes planus    Essential hypertension     Advance Care Planning Advance Directive is not on file.  ACP discussion was held with the patient during this visit. Patient has an advance directive (not in EMR), copy requested.            Objective   Vitals:    01/24/25 0853   BP: 145/80   Pulse: 61   Temp: 97.8 °F (36.6 °C)   TempSrc: Temporal   SpO2: 95%   Weight: 110 kg (242 lb)   Height: 185.4 cm (73\")       Estimated body mass index is 31.93 kg/m² as calculated " "from the following:    Height as of this encounter: 185.4 cm (73\").    Weight as of this encounter: 110 kg (242 lb).    BMI is >= 30 and <35. (Class 1 Obesity). The following options were offered after discussion;: exercise counseling/recommendations           Gait and Balance Evaluation:  Normal    Does the patient have evidence of cognitive impairment? No  Lab Results   Component Value Date    CHLPL 182 2025    TRIG 125 2025    HDL 28 (L) 2025     (H) 2025    VLDL 23 2025    HGBA1C 5.50 2025                                                                                                Health  Risk Assessment    Smoking Status:  Social History     Tobacco Use   Smoking Status Some Days    Types: Cigars   Smokeless Tobacco Never     Alcohol Consumption:  Social History     Substance and Sexual Activity   Alcohol Use Yes    Alcohol/week: 2.0 standard drinks of alcohol    Types: 2 Cans of beer per week    Comment: 2 drinks a week        Fall Risk Screen  STEADI Fall Risk Assessment was completed, and patient is at LOW risk for falls.Assessment completed on:2025    Depression Screening   Little interest or pleasure in doing things? Not at all   Feeling down, depressed, or hopeless? Not at all   PHQ-2 Total Score 0      Health Habits and Functional and Cognitive Screenin/24/2025     8:00 AM   Functional & Cognitive Status   Do you have difficulty preparing food and eating? No   Do you have difficulty bathing yourself, getting dressed or grooming yourself? No   Do you have difficulty using the toilet? No   Do you have difficulty moving around from place to place? No   Do you have trouble with steps or getting out of a bed or a chair? No   Current Diet Well Balanced Diet   Dental Exam Up to date   Eye Exam Up to date   Exercise (times per week) 1 times per week   Current Exercises Include No Regular Exercise;Walking;Weightlifting   Do you need help using the phone?  " No   Are you deaf or do you have serious difficulty hearing?  No   Do you need help to go to places out of walking distance? No   Do you need help shopping? No   Do you need help preparing meals?  No   Do you need help with housework?  No   Do you need help with laundry? No   Do you need help taking your medications? No   Do you need help managing money? No   Do you ever drive or ride in a car without wearing a seat belt? No   Have you felt unusual stress, anger or loneliness in the last month? No   Who do you live with? Spouse   If you need help, do you have trouble finding someone available to you? No   Have you been bothered in the last four weeks by sexual problems? No   Do you have difficulty concentrating, remembering or making decisions? No           Visual Acuity:  No results found.  Age-appropriate Screening Schedule:  Refer to the list below for future screening recommendations based on patient's age, sex and/or medical conditions. Orders for these recommended tests are listed in the plan section. The patient has been provided with a written plan.    Health Maintenance List  Health Maintenance   Topic Date Due    HEPATITIS C SCREENING  Never done    ANNUAL WELLNESS VISIT  Never done    Pneumococcal Vaccine 65+ (2 of 2 - PCV) 08/08/2019    BMI FOLLOWUP  03/06/2020    ZOSTER VACCINE (3 of 3) 04/05/2020    AAA SCREEN ONCE  Never done    INFLUENZA VACCINE  07/01/2024    COVID-19 Vaccine (4 - 2024-25 season) 09/01/2024    COLORECTAL CANCER SCREENING  10/13/2025    TDAP/TD VACCINES (3 - Td or Tdap) 10/23/2033                                                                                                                                                CMS Preventative Services Quick Reference  Risk Factors Identified During Encounter  Immunizations Discussed/Encouraged: Prevnar 20 (Pneumococcal 20-valent conjugate) and COVID19    The above risks/problems have been discussed with the patient.  Pertinent information  "has been shared with the patient in the After Visit Summary.  An After Visit Summary and PPPS were made available to the patient.    Follow Up:   Next Medicare Wellness visit to be scheduled in 1 year.         Additional E&M Note during same encounter follows:  Patient has additional, significant, and separately identifiable condition(s)/problem(s) that require work above and beyond the Medicare Wellness Visit     Chief Complaint  Welcome To Medicare    Subjective   HPI          Hypertension follow-up.  Continues losartan hydrochlorothiazide.  Blood pressure moderately elevated today 145 systolic.  Recent creatinine electrolytes normal.    Impaired fasting glucose.  Glucose 110.  A1c normal.    Dyslipidemia.  HDL low.  LDL slightly high.  10-year risk of cardiovascular disease approximately 20 to 30%.  Hide.  Probably because of the low HDL and history of hypertension and impaired fasting goes.    Elevated PSA.  Followed by urology.  He states that he had a biopsy done, it was negative.        Objective   Vital Signs:  /80   Pulse 61   Temp 97.8 °F (36.6 °C) (Temporal)   Ht 185.4 cm (73\")   Wt 110 kg (242 lb)   SpO2 95%   BMI 31.93 kg/m²   Physical Exam  Constitutional:       Appearance: Normal appearance.   HENT:      Head: Atraumatic.      Mouth/Throat:      Pharynx: Oropharynx is clear. No oropharyngeal exudate or posterior oropharyngeal erythema.   Eyes:      Conjunctiva/sclera: Conjunctivae normal.   Neck:      Thyroid: No thyroid mass, thyromegaly or thyroid tenderness.   Cardiovascular:      Rate and Rhythm: Normal rate and regular rhythm.      Pulses: Normal pulses.      Heart sounds: Normal heart sounds.   Pulmonary:      Effort: Pulmonary effort is normal.      Breath sounds: Normal breath sounds.   Abdominal:      General: Abdomen is flat. There is no distension.      Palpations: Abdomen is soft. There is no mass.      Tenderness: There is no abdominal tenderness.      Hernia: No hernia is " present.   Musculoskeletal:         General: Normal range of motion.      Cervical back: Normal range of motion and neck supple. No muscular tenderness.   Lymphadenopathy:      Cervical: No cervical adenopathy.   Skin:     General: Skin is warm and dry.      Findings: No rash.   Neurological:      General: No focal deficit present.      Mental Status: He is alert and oriented to person, place, and time.   Psychiatric:         Mood and Affect: Mood normal.         The following data was reviewed by: Fernandez Deutsch MD on 01/24/2025:    Common labs          1/17/2025    10:11   Common Labs   Glucose 110    BUN 17    Creatinine 1.00    Sodium 138    Potassium 4.6    Chloride 100    Calcium 9.7    Total Protein 7.1    Albumin 4.2    Total Bilirubin 0.9    Alkaline Phosphatase 88    AST (SGOT) 22    ALT (SGPT) 32    WBC 6.19    Hemoglobin 15.6    Hematocrit 47.1    Platelets 241    Total Cholesterol 182    Triglycerides 125    HDL Cholesterol 28    LDL Cholesterol  131    Hemoglobin A1C 5.50    PSA 4.600                Assessment and Plan     Annual Medicare visit.  Welcome Medicare physical.    Immunizations reviewed in detail.  Information given.    Hypertension.  Overall well-controlled.  Blood pressure moderately elevated today.  Continue to monitor.  See me 6 months.  We discussed heart healthy diet and exercise.    Dyslipidemia.  Low HDL.  Elevated cardiovascular risk likely.  Especially given risk factors.  Statin indicated.  Benefits outweigh risks.  Side effects discussed.  Starting atorvastatin low-dose 10 mg daily.  With muscle aches he will let me know.  See me 6 months.  Lab work prior.    Impaired fasting glucose.  No evidence progression of diabetes.    Lung cancer screening.  He quit smoking 20 years ago after many years of smoking.  However he had a CT scan done at urology about a year ago which revealed probable benign right lower lobe subcentimeter noncalcified nodules.  However given the smoking  history I do recommend a CT scan and he is aware.  He agrees.    AAA screening ordered.  Indicated.               Medicare welcome exam         Essential hypertension      Orders:    Comprehensive Metabolic Panel; Future    Lipid Panel; Future    Hemoglobin A1c; Future    Impaired fasting glucose    Orders:    Comprehensive Metabolic Panel; Future    Lipid Panel; Future    Hemoglobin A1c; Future    Lung nodule    Orders:    CT Chest Without Contrast; Future    Personal history of tobacco use, presenting hazards to health    Orders:    [place] aaa screen limited; Future            Follow Up   No follow-ups on file.  Patient was given instructions and counseling regarding his condition or for health maintenance advice. Please see specific information pulled into the AVS if appropriate.

## 2025-01-24 NOTE — ASSESSMENT & PLAN NOTE
Orders:    Comprehensive Metabolic Panel; Future    Lipid Panel; Future    Hemoglobin A1c; Future

## 2025-02-07 ENCOUNTER — HOSPITAL ENCOUNTER (OUTPATIENT)
Dept: ULTRASOUND IMAGING | Facility: HOSPITAL | Age: 65
Discharge: HOME | End: 2025-02-07
Payer: MEDICARE

## 2025-02-07 ENCOUNTER — HOSPITAL ENCOUNTER (OUTPATIENT)
Dept: CT IMAGING | Facility: HOSPITAL | Age: 65
Discharge: HOME | End: 2025-02-07
Payer: MEDICARE

## 2025-02-07 DIAGNOSIS — Z87.891 PERSONAL HISTORY OF TOBACCO USE, PRESENTING HAZARDS TO HEALTH: ICD-10-CM

## 2025-02-07 DIAGNOSIS — R91.1 LUNG NODULE: ICD-10-CM

## 2025-02-07 PROCEDURE — 71250 CT THORAX DX C-: CPT

## 2025-02-07 PROCEDURE — 76706 US ABDL AORTA SCREEN AAA: CPT

## 2025-04-28 NOTE — PROGRESS NOTES
Gui comes in today for follow-up of both the right knee and right shoulder.  He has gotten both injected in the past and wants to get those repeated today.    The right shoulder is examined.  Skin is benign.  Motion is full.  He has pain and weakness with elevation in the scapular plane.    The right knee is also examined.  Skin is benign.  Trace effusion.  Moderate medial joint line tenderness.    Bilateral standing AP views of the knees, bilateral merchants, and a right knee lateral x-ray are ordered and reviewed.  These are compared to previous x-rays.  He has what appears to be severe medial compartment osteoarthritis with mild associated patellofemoral degenerative changes.  The arthritis does appear to be slightly worse than on his previous films.    Assessment is right knee end stage osteoarthritis, right shoulder rotator cuff tear    We discussed his options for both the knee and shoulder.  Given the persistent nature of his shoulder symptoms, I think it would be prudent to get an MRI.  He tells me that he has no interest in pursuing that.  The injections have worked very well for him in the past and he wants to get that repeated today.  He also wants to get the knee reinjected.  Again, he has no interest in pursuing any other options for the knee because the injections have been working well.  The risks, benefits, and alternatives were discussed.  He consented to proceed as described below.  Going forward, I will release him to follow-up as needed.    Large Joint Arthrocentesis  Date/Time: 1/27/2017 1:14 PM  Consent given by: patient  Site marked: site marked  Timeout: Immediately prior to procedure a time out was called to verify the correct patient, procedure, equipment, support staff and site/side marked as required   Supporting Documentation  Indications: pain and joint swelling   Procedure Details  Location: knee - R knee  Preparation: Patient was prepped and draped in the usual sterile  fashion  Needle size: 25 G (21 G)  Approach: anterolateral  Medications administered: 2 mL lidocaine 1 %; 160 mg methylPREDNISolone acetate 80 MG/ML; 2 mL bupivacaine  Patient tolerance: patient tolerated the procedure well with no immediate complications    Large Joint Arthrocentesis  Date/Time: 1/27/2017 1:15 PM  Consent given by: patient  Site marked: site marked  Timeout: Immediately prior to procedure a time out was called to verify the correct patient, procedure, equipment, support staff and site/side marked as required   Supporting Documentation  Indications: pain and joint swelling   Procedure Details  Location: shoulder - R subacromial bursa  Preparation: Patient was prepped and draped in the usual sterile fashion  Needle size: 25 G  Approach: posterior  Medications administered: 2 mL lidocaine 1 %; 160 mg methylPREDNISolone acetate 80 MG/ML; 2 mL bupivacaine  Patient tolerance: patient tolerated the procedure well with no immediate complications           1 pair

## 2025-05-09 RX ORDER — LOSARTAN POTASSIUM AND HYDROCHLOROTHIAZIDE 12.5; 5 MG/1; MG/1
0.5 TABLET ORAL DAILY
Qty: 45 TABLET | Refills: 1 | Status: SHIPPED | OUTPATIENT
Start: 2025-05-09

## 2025-05-09 NOTE — TELEPHONE ENCOUNTER
Rx Refill Note  Requested Prescriptions     Pending Prescriptions Disp Refills    losartan-hydrochlorothiazide (HYZAAR) 50-12.5 MG per tablet [Pharmacy Med Name: Losartan Potassium-HCTZ Oral Tablet 50-12.5 MG] 45 tablet 1     Sig: TAKE 1/2 TABLET EVERY DAY      Last office visit with prescribing clinician: 1/24/2025   Last telemedicine visit with prescribing clinician: Visit date not found   Next office visit with prescribing clinician: 7/25/2025                         Would you like a call back once the refill request has been completed: [] Yes [] No    If the office needs to give you a call back, can they leave a voicemail: [] Yes [] No    Melvi Henriquez MA  05/09/25, 07:38 EDT

## 2025-07-15 ENCOUNTER — TELEPHONE (OUTPATIENT)
Dept: FAMILY MEDICINE CLINIC | Facility: CLINIC | Age: 65
End: 2025-07-15
Payer: MEDICARE

## 2025-07-15 DIAGNOSIS — Z11.59 NEED FOR HEPATITIS C SCREENING TEST: ICD-10-CM

## 2025-07-22 ENCOUNTER — OFFICE VISIT (OUTPATIENT)
Dept: FAMILY MEDICINE CLINIC | Facility: CLINIC | Age: 65
End: 2025-07-22
Payer: MEDICARE

## 2025-07-22 VITALS
HEIGHT: 73 IN | RESPIRATION RATE: 18 BRPM | SYSTOLIC BLOOD PRESSURE: 144 MMHG | TEMPERATURE: 98 F | BODY MASS INDEX: 31.87 KG/M2 | HEART RATE: 60 BPM | WEIGHT: 240.5 LBS | DIASTOLIC BLOOD PRESSURE: 80 MMHG | OXYGEN SATURATION: 97 %

## 2025-07-22 DIAGNOSIS — E78.5 HYPERLIPIDEMIA, UNSPECIFIED HYPERLIPIDEMIA TYPE: ICD-10-CM

## 2025-07-22 DIAGNOSIS — R73.01 IMPAIRED FASTING GLUCOSE: ICD-10-CM

## 2025-07-22 DIAGNOSIS — I10 ESSENTIAL HYPERTENSION: Primary | ICD-10-CM

## 2025-07-22 PROCEDURE — G2211 COMPLEX E/M VISIT ADD ON: HCPCS | Performed by: FAMILY MEDICINE

## 2025-07-22 PROCEDURE — 3079F DIAST BP 80-89 MM HG: CPT | Performed by: FAMILY MEDICINE

## 2025-07-22 PROCEDURE — 3077F SYST BP >= 140 MM HG: CPT | Performed by: FAMILY MEDICINE

## 2025-07-22 PROCEDURE — 1125F AMNT PAIN NOTED PAIN PRSNT: CPT | Performed by: FAMILY MEDICINE

## 2025-07-22 PROCEDURE — 99214 OFFICE O/P EST MOD 30 MIN: CPT | Performed by: FAMILY MEDICINE

## 2025-07-22 NOTE — PROGRESS NOTES
"Chief Complaint  Hypertension    Subjective        Gui Zamora presents to [facility]  Hypertension    Physical  Pertinent negative symptoms include no joint pain.     Hypertension follow-up.  Continues losartan hydrochlorothiazide.  Creatinine electrolytes are normal.  Blood pressure slightly elevated today.  He started exercising more recently.  He states he eats a lot of sweets.  Quit smoking greater than 20 years ago.  Minimal to no alcohol use per    Pulmonary nodules.  Also followed by urology.  I reviewed their notes.  The CT scan I ordered in February showed a stable 6 mm lung nodule.  I read the urology notes, they see it also.  And they are going to be monitoring his CT scans for both the renal lesion which is stable reportedly and also the lung lesions.  Patient has no pulmonary symptoms of concern.    Hyperlipidemia.  Started statin last visit, low-dose atorvastatin 10 mg daily.  No adverse effects.  Normal liver enzymes.  His LDL dropped 50%.    Impaired fasting glucose.  Most recently 112.  May be slightly exacerbated by the hydrochlorothiazide.  A1c 5.7% which is relatively stable.  There is been no evidence of progression towards diabetes.    Objective   Vital Signs:  /80 (BP Location: Left arm, Patient Position: Sitting, Cuff Size: Adult)   Pulse 60   Temp 98 °F (36.7 °C) (Oral)   Resp 18   Ht 185.4 cm (73\")   Wt 109 kg (240 lb 8 oz)   SpO2 97%   BMI 31.73 kg/m²   Estimated body mass index is 31.73 kg/m² as calculated from the following:    Height as of this encounter: 185.4 cm (73\").    Weight as of this encounter: 109 kg (240 lb 8 oz).            Physical Exam  Vitals and nursing note reviewed.   Constitutional:       General: He is not in acute distress.     Appearance: He is well-developed.   Cardiovascular:      Rate and Rhythm: Normal rate and regular rhythm.      Heart sounds: Normal heart sounds.   Pulmonary:      Effort: Pulmonary effort is " normal.      Breath sounds: Normal breath sounds.   Skin:     General: Skin is warm and dry.   Psychiatric:         Mood and Affect: Mood normal.        Result Review :  The following data was reviewed by: Fernandez Deutsch MD on 07/22/2025:  Common labs          1/17/2025    10:11 7/18/2025    08:02   Common Labs   Glucose 110  112    BUN 17  18.0    Creatinine 1.00  0.96    Sodium 138  140    Potassium 4.6  4.2    Chloride 100  103    Calcium 9.7  9.4    Albumin 4.2  4.3    Total Bilirubin 0.9  0.9    Alkaline Phosphatase 88  84    AST (SGOT) 22  21    ALT (SGPT) 32  20    WBC 6.19     Hemoglobin 15.6     Hematocrit 47.1     Platelets 241     Total Cholesterol 182  115    Triglycerides 125  74    HDL Cholesterol 28  31    LDL Cholesterol  131  69    Hemoglobin A1C 5.50  5.70    PSA 4.600                   Assessment and Plan   Diagnoses and all orders for this visit:    1. Essential hypertension (Primary)  -     Hemoglobin A1c; Future  -     CBC & Differential; Future  -     Comprehensive Metabolic Panel; Future  -     Lipid Panel; Future    2. Hyperlipidemia, unspecified hyperlipidemia type  -     Hemoglobin A1c; Future  -     CBC & Differential; Future  -     Comprehensive Metabolic Panel; Future  -     Lipid Panel; Future    3. Impaired fasting glucose  -     Hemoglobin A1c; Future  -     CBC & Differential; Future  -     Comprehensive Metabolic Panel; Future  -     Lipid Panel; Future    Hypertension.  Fair control.  He is going to work on diet and exercise.  We discussed.  Hold off change of medication.  See me in 6 months for Medicare wellness visit.  If blood pressure still elevated then, add amlodipine.    Hyperlipidemia.  Continues low-dose statin.  50% drop in LDL since starting.    Impaired fasting glucose.  May be slightly exacerbated by the hydrochlorothiazide.  Continue hydrochlorothiazide as above.  Will continue to monitor the A1c.  He is going to work on his diet and exercise.    Lung nodule.  And  also previous renal nodule.  Both followed by urology.         Follow Up   Return in about 6 months (around 1/22/2026) for Subsequent Medicare Wellness Visit, Lab Visit One Week Prior to Next Appointment.  Patient was given instructions and counseling regarding his condition or for health maintenance advice. Please see specific information pulled into the AVS if appropriate.

## (undated) DEVICE — PK KN TOTL 40

## (undated) DEVICE — PAD,ABDOMINAL,8"X10",ST,LF: Brand: MEDLINE

## (undated) DEVICE — GLV SURG BIOGEL LTX PF 8 1/2

## (undated) DEVICE — DRSNG TELFA PAD NONADH STR 1S 3X8IN

## (undated) DEVICE — SOL NACL 0.9PCT 100ML SGL

## (undated) DEVICE — UNDERCAST PADDING: Brand: DEROYAL

## (undated) DEVICE — GLV SURG TRIUMPH CLASSIC PF LTX 8.5 STRL

## (undated) DEVICE — PREP SOL POVIDONE/IODINE BT 4OZ

## (undated) DEVICE — SUT VIC 2/0 CT2 27IN J269H

## (undated) DEVICE — DUAL CUT SAGITTAL BLADE

## (undated) DEVICE — SKIN PREP TRAY W/CHG: Brand: MEDLINE INDUSTRIES, INC.

## (undated) DEVICE — BNDG ELAS ELITE V/CLOSE 6IN 5YD LF STRL

## (undated) DEVICE — ANTIBACTERIAL UNDYED BRAIDED (POLYGLACTIN 910), SYNTHETIC ABSORBABLE SUTURE: Brand: COATED VICRYL

## (undated) DEVICE — APPL CHLORAPREP W/TINT 26ML ORNG

## (undated) DEVICE — SOL ISO/ALC RUB 70PCT 4OZ

## (undated) DEVICE — DRSNG SURESITE WNDW 4X4.5